# Patient Record
Sex: MALE | Race: WHITE | NOT HISPANIC OR LATINO | Employment: FULL TIME | URBAN - METROPOLITAN AREA
[De-identification: names, ages, dates, MRNs, and addresses within clinical notes are randomized per-mention and may not be internally consistent; named-entity substitution may affect disease eponyms.]

---

## 2023-05-18 ENCOUNTER — OFFICE VISIT (OUTPATIENT)
Dept: PRIMARY CARE | Facility: CLINIC | Age: 58
End: 2023-05-18
Payer: COMMERCIAL

## 2023-05-18 VITALS
RESPIRATION RATE: 17 BRPM | HEART RATE: 82 BPM | SYSTOLIC BLOOD PRESSURE: 140 MMHG | DIASTOLIC BLOOD PRESSURE: 70 MMHG | OXYGEN SATURATION: 94 % | HEIGHT: 64 IN | WEIGHT: 200 LBS | BODY MASS INDEX: 34.15 KG/M2 | TEMPERATURE: 98.2 F

## 2023-05-18 DIAGNOSIS — M79.672 PAIN IN BOTH FEET: ICD-10-CM

## 2023-05-18 DIAGNOSIS — S90.822A FRICTION BLISTERS OF SOLE OF LEFT FOOT, INITIAL ENCOUNTER: ICD-10-CM

## 2023-05-18 DIAGNOSIS — M79.671 PAIN IN BOTH FEET: ICD-10-CM

## 2023-05-18 DIAGNOSIS — G40.109 PARTIAL SYMPTOMATIC EPILEPSY WITH SIMPLE PARTIAL SEIZURES, NOT INTRACTABLE, WITHOUT STATUS EPILEPTICUS (MULTI): Primary | ICD-10-CM

## 2023-05-18 DIAGNOSIS — E78.2 MIXED HYPERLIPIDEMIA: ICD-10-CM

## 2023-05-18 DIAGNOSIS — R03.0 ELEVATED BLOOD PRESSURE READING: ICD-10-CM

## 2023-05-18 DIAGNOSIS — R35.1 NOCTURIA: ICD-10-CM

## 2023-05-18 PROBLEM — R51.9 FRONTAL HEADACHE: Status: RESOLVED | Noted: 2023-05-18 | Resolved: 2023-05-18

## 2023-05-18 PROBLEM — G47.39 COMPLEX SLEEP APNEA SYNDROME: Status: ACTIVE | Noted: 2023-05-18

## 2023-05-18 PROBLEM — R19.5 POSITIVE COLORECTAL CANCER SCREENING USING COLOGUARD TEST: Status: ACTIVE | Noted: 2023-05-18

## 2023-05-18 PROBLEM — G89.29 CHRONIC HEADACHE: Status: RESOLVED | Noted: 2023-05-18 | Resolved: 2023-05-18

## 2023-05-18 PROBLEM — E78.5 HYPERLIPEMIA: Status: ACTIVE | Noted: 2023-05-18

## 2023-05-18 PROBLEM — L84 CORN OF FOOT: Status: ACTIVE | Noted: 2023-05-18

## 2023-05-18 PROBLEM — J30.9 ALLERGIC RHINITIS: Status: ACTIVE | Noted: 2023-05-18

## 2023-05-18 PROBLEM — G40.909 EPILEPSY WITHOUT STATUS EPILEPTICUS, NOT INTRACTABLE (MULTI): Status: ACTIVE | Noted: 2023-05-18

## 2023-05-18 PROBLEM — M25.529 JOINT PAIN, ELBOW: Status: ACTIVE | Noted: 2023-05-18

## 2023-05-18 PROBLEM — G47.31 COMPLEX SLEEP APNEA SYNDROME: Status: ACTIVE | Noted: 2023-05-18

## 2023-05-18 PROBLEM — K21.9 ESOPHAGEAL REFLUX: Status: RESOLVED | Noted: 2023-05-18 | Resolved: 2023-05-18

## 2023-05-18 PROBLEM — R51.9 CHRONIC HEADACHE: Status: RESOLVED | Noted: 2023-05-18 | Resolved: 2023-05-18

## 2023-05-18 PROCEDURE — 1036F TOBACCO NON-USER: CPT | Performed by: NURSE PRACTITIONER

## 2023-05-18 PROCEDURE — 99214 OFFICE O/P EST MOD 30 MIN: CPT | Performed by: NURSE PRACTITIONER

## 2023-05-18 RX ORDER — LEVETIRACETAM 500 MG/1
1000 TABLET ORAL 2 TIMES DAILY
Qty: 360 TABLET | Refills: 1 | Status: ON HOLD | OUTPATIENT
Start: 2023-05-18 | End: 2024-01-26 | Stop reason: SDUPTHER

## 2023-05-18 RX ORDER — PHENYTOIN SODIUM 100 MG/1
100 CAPSULE, EXTENDED RELEASE ORAL DAILY
COMMUNITY
Start: 2015-09-17 | End: 2023-05-18 | Stop reason: SDUPTHER

## 2023-05-18 RX ORDER — LEVETIRACETAM 500 MG/1
2 TABLET ORAL 2 TIMES DAILY
COMMUNITY
Start: 2021-08-24 | End: 2023-05-18 | Stop reason: SDUPTHER

## 2023-05-18 RX ORDER — PHENYTOIN SODIUM 100 MG/1
100 CAPSULE, EXTENDED RELEASE ORAL DAILY
Qty: 90 CAPSULE | Refills: 1 | Status: SHIPPED | OUTPATIENT
Start: 2023-05-18 | End: 2024-01-26 | Stop reason: HOSPADM

## 2023-05-18 RX ORDER — DICLOFENAC SODIUM 30 MG/G
3 GEL TOPICAL 2 TIMES DAILY
COMMUNITY
Start: 2022-11-14

## 2023-05-18 ASSESSMENT — ENCOUNTER SYMPTOMS
CONSTITUTIONAL NEGATIVE: 1
CARDIOVASCULAR NEGATIVE: 1
EYES NEGATIVE: 1
ENDOCRINE NEGATIVE: 1
ALLERGIC/IMMUNOLOGIC NEGATIVE: 1
GASTROINTESTINAL NEGATIVE: 1
HEMATOLOGIC/LYMPHATIC NEGATIVE: 1
RESPIRATORY NEGATIVE: 1
PSYCHIATRIC NEGATIVE: 1
MUSCULOSKELETAL NEGATIVE: 1
NEUROLOGICAL NEGATIVE: 1

## 2023-05-18 NOTE — PATIENT INSTRUCTIONS
Dr Camacho Kulkarni and Dr Yancey   Address: [address] [place] [address], [place] [address]  Hours:   Closed · Opens 9 AM Fri  Phone: [phone]

## 2023-05-18 NOTE — PROGRESS NOTES
"Subjective   Patient ID: Chas Godinez is a 58 y.o. male who presents for Follow-up, Med Refill, and Blister (Gets blisters on bottom on feet ).      Has been developing blisters to bilateral feet for the past year.  Has at times put corn pads to areas but forgets to do it everyday.  Stands on feet daily at work, 8-10 hours a day.  Wears steal toe tennis shoes to work. States that socks are not damp from sweat, does not feel feet sweat during the day. When blisters are active has a hard time walking and rodri painful 8/10.     He has not had any seizures since the last one 1.5 years ago     Needs blood work          Review of Systems   Constitutional: Negative.    HENT: Negative.     Eyes: Negative.    Respiratory: Negative.     Cardiovascular: Negative.    Gastrointestinal: Negative.    Endocrine: Negative.    Genitourinary: Negative.    Musculoskeletal: Negative.    Skin: Negative.    Allergic/Immunologic: Negative.    Neurological: Negative.    Hematological: Negative.    Psychiatric/Behavioral: Negative.         Objective   /70   Pulse 82   Temp 36.8 °C (98.2 °F)   Resp 17   Ht 1.613 m (5' 3.5\")   Wt 90.7 kg (200 lb)   SpO2 94%   BMI 34.87 kg/m²     Physical Exam  Constitutional:       Appearance: Normal appearance.   HENT:      Head: Normocephalic.      Nose: Nose normal.      Mouth/Throat:      Mouth: Mucous membranes are moist.   Eyes:      Extraocular Movements: Extraocular movements intact.      Pupils: Pupils are equal, round, and reactive to light.   Cardiovascular:      Rate and Rhythm: Normal rate and regular rhythm.      Pulses: Normal pulses.      Heart sounds: Normal heart sounds.   Pulmonary:      Effort: Pulmonary effort is normal.      Breath sounds: Normal breath sounds.   Abdominal:      General: Bowel sounds are normal.      Palpations: Abdomen is soft.   Musculoskeletal:         General: Normal range of motion.      Cervical back: Normal range of motion.   Skin:     Comments: Left " planter foot at medial aspect of the arch, blister with callous present   Neurological:      Mental Status: He is alert.   Psychiatric:         Mood and Affect: Mood normal.         Behavior: Behavior normal.         Thought Content: Thought content normal.         Judgment: Judgment normal.         Assessment/Plan   Problem List Items Addressed This Visit          Nervous    Epilepsy without status epilepticus, not intractable (CMS/HCC) - Primary    Relevant Medications    phenytoin ER (Dilantin) 100 mg capsule    levETIRAcetam (Keppra) 500 mg tablet    Other Relevant Orders    CBC and Auto Differential    Comprehensive Metabolic Panel       Other    Hyperlipemia    Relevant Orders    Hemoglobin A1C     Other Visit Diagnoses       Elevated blood pressure reading        Relevant Orders    Comprehensive Metabolic Panel    Lipid Panel    TSH with reflex to Free T4 if abnormal    Nocturia        Relevant Orders    Prostate Specific Antigen    Pain in both feet        Friction blisters of sole of left foot, initial encounter

## 2023-07-19 ENCOUNTER — HOSPITAL ENCOUNTER (OUTPATIENT)
Dept: DATA CONVERSION | Facility: HOSPITAL | Age: 58
End: 2023-07-19
Attending: INTERNAL MEDICINE | Admitting: INTERNAL MEDICINE
Payer: COMMERCIAL

## 2023-07-19 DIAGNOSIS — K29.50 UNSPECIFIED CHRONIC GASTRITIS WITHOUT BLEEDING: ICD-10-CM

## 2023-07-19 DIAGNOSIS — R13.10 DYSPHAGIA, UNSPECIFIED: ICD-10-CM

## 2023-07-19 DIAGNOSIS — K21.9 GASTRO-ESOPHAGEAL REFLUX DISEASE WITHOUT ESOPHAGITIS: ICD-10-CM

## 2023-07-26 LAB
COMPLETE PATHOLOGY REPORT: NORMAL
CONVERTED CLINICAL DIAGNOSIS-HISTORY: NORMAL
CONVERTED FINAL DIAGNOSIS: NORMAL
CONVERTED FINAL REPORT PDF LINK TO COPY AND PASTE: NORMAL
CONVERTED GROSS DESCRIPTION: NORMAL

## 2023-08-10 ENCOUNTER — TELEPHONE (OUTPATIENT)
Dept: PRIMARY CARE | Facility: CLINIC | Age: 58
End: 2023-08-10
Payer: COMMERCIAL

## 2023-08-10 DIAGNOSIS — G40.109 PARTIAL SYMPTOMATIC EPILEPSY WITH SIMPLE PARTIAL SEIZURES, NOT INTRACTABLE, WITHOUT STATUS EPILEPTICUS (MULTI): ICD-10-CM

## 2023-08-10 NOTE — TELEPHONE ENCOUNTER
Wrong amount of Dilantin was sent in    He takes 4-100mg daily #360    Please send to [facility]

## 2023-08-11 RX ORDER — PHENYTOIN SODIUM 100 MG/1
4 CAPSULE, EXTENDED RELEASE ORAL DAILY
COMMUNITY
Start: 2015-09-17 | End: 2023-08-11 | Stop reason: SDUPTHER

## 2023-08-11 RX ORDER — PHENYTOIN SODIUM 100 MG/1
400 CAPSULE, EXTENDED RELEASE ORAL DAILY
Qty: 360 CAPSULE | Refills: 3 | Status: SHIPPED | OUTPATIENT
Start: 2023-08-11 | End: 2024-02-13 | Stop reason: SDUPTHER

## 2023-09-29 VITALS — HEIGHT: 65 IN | WEIGHT: 198.41 LBS | BODY MASS INDEX: 33.06 KG/M2

## 2023-11-10 ENCOUNTER — TELEPHONE (OUTPATIENT)
Dept: GASTROENTEROLOGY | Facility: CLINIC | Age: 58
End: 2023-11-10
Payer: COMMERCIAL

## 2024-01-23 ENCOUNTER — ANESTHESIA EVENT (OUTPATIENT)
Dept: GASTROENTEROLOGY | Facility: HOSPITAL | Age: 59
End: 2024-01-23
Payer: COMMERCIAL

## 2024-01-23 ENCOUNTER — HOSPITAL ENCOUNTER (OUTPATIENT)
Dept: GASTROENTEROLOGY | Facility: HOSPITAL | Age: 59
Discharge: HOME | End: 2024-01-23
Payer: COMMERCIAL

## 2024-01-23 ENCOUNTER — ANESTHESIA (OUTPATIENT)
Dept: GASTROENTEROLOGY | Facility: HOSPITAL | Age: 59
End: 2024-01-23
Payer: COMMERCIAL

## 2024-01-23 VITALS
RESPIRATION RATE: 16 BRPM | HEART RATE: 67 BPM | TEMPERATURE: 97.8 F | DIASTOLIC BLOOD PRESSURE: 99 MMHG | SYSTOLIC BLOOD PRESSURE: 168 MMHG | OXYGEN SATURATION: 95 %

## 2024-01-23 DIAGNOSIS — R19.5 POSITIVE COLORECTAL CANCER SCREENING USING COLOGUARD TEST: ICD-10-CM

## 2024-01-23 PROCEDURE — 7100000009 HC PHASE TWO TIME - INITIAL BASE CHARGE: Performed by: INTERNAL MEDICINE

## 2024-01-23 PROCEDURE — 3700000001 HC GENERAL ANESTHESIA TIME - INITIAL BASE CHARGE: Performed by: INTERNAL MEDICINE

## 2024-01-23 PROCEDURE — 3700000002 HC GENERAL ANESTHESIA TIME - EACH INCREMENTAL 1 MINUTE: Performed by: INTERNAL MEDICINE

## 2024-01-23 PROCEDURE — 88305 TISSUE EXAM BY PATHOLOGIST: CPT | Performed by: STUDENT IN AN ORGANIZED HEALTH CARE EDUCATION/TRAINING PROGRAM

## 2024-01-23 PROCEDURE — A4649 SURGICAL SUPPLIES: HCPCS | Performed by: INTERNAL MEDICINE

## 2024-01-23 PROCEDURE — 2500000004 HC RX 250 GENERAL PHARMACY W/ HCPCS (ALT 636 FOR OP/ED): Performed by: INTERNAL MEDICINE

## 2024-01-23 PROCEDURE — 7100000010 HC PHASE TWO TIME - EACH INCREMENTAL 1 MINUTE: Performed by: INTERNAL MEDICINE

## 2024-01-23 PROCEDURE — 45385 COLONOSCOPY W/LESION REMOVAL: CPT | Performed by: INTERNAL MEDICINE

## 2024-01-23 PROCEDURE — 2500000005 HC RX 250 GENERAL PHARMACY W/O HCPCS: Performed by: NURSE ANESTHETIST, CERTIFIED REGISTERED

## 2024-01-23 PROCEDURE — 88305 TISSUE EXAM BY PATHOLOGIST: CPT | Mod: TC,SUR,PORLAB | Performed by: INTERNAL MEDICINE

## 2024-01-23 PROCEDURE — 2500000004 HC RX 250 GENERAL PHARMACY W/ HCPCS (ALT 636 FOR OP/ED): Performed by: NURSE ANESTHETIST, CERTIFIED REGISTERED

## 2024-01-23 RX ORDER — LIDOCAINE HYDROCHLORIDE 20 MG/ML
INJECTION, SOLUTION INFILTRATION; PERINEURAL AS NEEDED
Status: DISCONTINUED | OUTPATIENT
Start: 2024-01-23 | End: 2024-01-23

## 2024-01-23 RX ORDER — FENTANYL CITRATE 50 UG/ML
INJECTION, SOLUTION INTRAMUSCULAR; INTRAVENOUS AS NEEDED
Status: DISCONTINUED | OUTPATIENT
Start: 2024-01-23 | End: 2024-01-23

## 2024-01-23 RX ORDER — SODIUM CHLORIDE 9 MG/ML
20 INJECTION, SOLUTION INTRAVENOUS CONTINUOUS
Status: DISCONTINUED | OUTPATIENT
Start: 2024-01-23 | End: 2024-01-24 | Stop reason: HOSPADM

## 2024-01-23 RX ORDER — PROPOFOL 10 MG/ML
INJECTION, EMULSION INTRAVENOUS AS NEEDED
Status: DISCONTINUED | OUTPATIENT
Start: 2024-01-23 | End: 2024-01-23

## 2024-01-23 RX ADMIN — FENTANYL CITRATE 25 MCG: 50 INJECTION, SOLUTION INTRAMUSCULAR; INTRAVENOUS at 09:33

## 2024-01-23 RX ADMIN — LIDOCAINE HYDROCHLORIDE 4 ML: 20 INJECTION, SOLUTION INFILTRATION; PERINEURAL at 09:30

## 2024-01-23 RX ADMIN — FENTANYL CITRATE 25 MCG: 50 INJECTION, SOLUTION INTRAMUSCULAR; INTRAVENOUS at 09:39

## 2024-01-23 RX ADMIN — PROPOFOL 50 MG: 10 INJECTION, EMULSION INTRAVENOUS at 09:33

## 2024-01-23 RX ADMIN — FENTANYL CITRATE 50 MCG: 50 INJECTION, SOLUTION INTRAMUSCULAR; INTRAVENOUS at 09:30

## 2024-01-23 RX ADMIN — PROPOFOL 100 MG: 10 INJECTION, EMULSION INTRAVENOUS at 09:30

## 2024-01-23 RX ADMIN — PROPOFOL 20 MG: 10 INJECTION, EMULSION INTRAVENOUS at 09:46

## 2024-01-23 RX ADMIN — PROPOFOL 30 MG: 10 INJECTION, EMULSION INTRAVENOUS at 09:39

## 2024-01-23 RX ADMIN — SODIUM CHLORIDE 20 ML/HR: 9 INJECTION, SOLUTION INTRAVENOUS at 09:15

## 2024-01-23 SDOH — HEALTH STABILITY: MENTAL HEALTH: CURRENT SMOKER: 0

## 2024-01-23 ASSESSMENT — COLUMBIA-SUICIDE SEVERITY RATING SCALE - C-SSRS
1. IN THE PAST MONTH, HAVE YOU WISHED YOU WERE DEAD OR WISHED YOU COULD GO TO SLEEP AND NOT WAKE UP?: NO
6. HAVE YOU EVER DONE ANYTHING, STARTED TO DO ANYTHING, OR PREPARED TO DO ANYTHING TO END YOUR LIFE?: NO
2. HAVE YOU ACTUALLY HAD ANY THOUGHTS OF KILLING YOURSELF?: NO

## 2024-01-23 ASSESSMENT — PAIN SCALES - GENERAL
PAINLEVEL_OUTOF10: 0 - NO PAIN
PAIN_LEVEL: 0
PAINLEVEL_OUTOF10: 0 - NO PAIN

## 2024-01-23 ASSESSMENT — PAIN - FUNCTIONAL ASSESSMENT
PAIN_FUNCTIONAL_ASSESSMENT: 0-10

## 2024-01-23 NOTE — ANESTHESIA POSTPROCEDURE EVALUATION
Patient: Chas Godinez    Procedure Summary       Date: 01/23/24 Room / Location: [facility]    Anesthesia Start: 0925 Anesthesia Stop: 1000    Procedure: COLONOSCOPY Diagnosis: Positive colorectal cancer screening using Cologuard test    Scheduled Providers: Augie Painting DO Responsible Provider: LADAN Folr    Anesthesia Type: MAC ASA Status: 3            Anesthesia Type: MAC    Vitals Value Taken Time   /75 01/23/24 1000   Temp 36.4 °C (97.6 °F) 01/23/24 1000   Pulse 75 01/23/24 1000   Resp 16 01/23/24 1000   SpO2 94 % 01/23/24 1000       Anesthesia Post Evaluation    Patient location during evaluation: bedside  Patient participation: complete - patient participated  Level of consciousness: awake and alert  Pain score: 0  Pain management: adequate  Airway patency: patent  Cardiovascular status: acceptable  Respiratory status: acceptable  Hydration status: acceptable  Postoperative Nausea and Vomiting: none    There were no known notable events for this encounter.

## 2024-01-23 NOTE — H&P
History Of Present Illness  Chas Godinez is a 58 y.o. male presenting with + cologard .     Past Medical History  Past Medical History:   Diagnosis Date    Epilepsy (CMS/HCC)     Esophageal reflux 05/18/2023       Surgical History  Past Surgical History:   Procedure Laterality Date    ELBOW SURGERY  09/19/2016    Elbow Surgery        Social History  He reports that he quit smoking about 37 years ago. His smoking use included cigarettes. He has never used smokeless tobacco. He reports that he does not drink alcohol and does not use drugs.    Family History  No family history on file.     Allergies  Patient has no known allergies.    Review of Systems     Physical Exam  Vitals reviewed.   Constitutional:       General: He is awake.      Appearance: Normal appearance.   HENT:      Head: Normocephalic and atraumatic.      Nose: Nose normal.      Mouth/Throat:      Mouth: Mucous membranes are moist.   Eyes:      Pupils: Pupils are equal, round, and reactive to light.   Cardiovascular:      Rate and Rhythm: Normal rate.   Pulmonary:      Effort: Pulmonary effort is normal.   Neurological:      Mental Status: He is alert and oriented to person, place, and time. Mental status is at baseline.   Psychiatric:         Attention and Perception: Attention and perception normal.         Mood and Affect: Mood normal.         Behavior: Behavior normal.          Last Recorded Vitals  Blood pressure (!) 185/98, pulse 73, temperature 36.3 °C (97.3 °F), temperature source Temporal, resp. rate 20, SpO2 99 %.    Relevant Results        Current Outpatient Medications   Medication Instructions    diclofenac sodium 3 % gel 3 Applications, Topical, 2 times daily    levETIRAcetam (KEPPRA) 1,000 mg, oral, 2 times daily    phenytoin ER (DILANTIN) 100 mg, oral, Daily    phenytoin ER (DILANTIN) 400 mg, oral, Daily          Assessment/Plan   Active Problems:  There are no active Hospital Problems.      + coplogard     Jaya for graham MONTERO  DO Mert

## 2024-01-24 NOTE — ADDENDUM NOTE
Encounter addended by: Delores Wyatt RN on: 1/24/2024 10:51 AM   Actions taken: Contacts section saved, Flowsheet accepted

## 2024-01-25 ENCOUNTER — APPOINTMENT (OUTPATIENT)
Dept: CARDIOLOGY | Facility: HOSPITAL | Age: 59
DRG: 084 | End: 2024-01-25
Payer: COMMERCIAL

## 2024-01-25 ENCOUNTER — APPOINTMENT (OUTPATIENT)
Dept: RADIOLOGY | Facility: HOSPITAL | Age: 59
DRG: 084 | End: 2024-01-25
Payer: COMMERCIAL

## 2024-01-25 ENCOUNTER — HOSPITAL ENCOUNTER (INPATIENT)
Facility: HOSPITAL | Age: 59
LOS: 1 days | Discharge: HOME | DRG: 084 | End: 2024-01-26
Attending: EMERGENCY MEDICINE | Admitting: INTERNAL MEDICINE
Payer: COMMERCIAL

## 2024-01-25 DIAGNOSIS — R56.9 SEIZURE (MULTI): Primary | ICD-10-CM

## 2024-01-25 DIAGNOSIS — I60.9 SUBARACHNOID HEMORRHAGE (MULTI): ICD-10-CM

## 2024-01-25 DIAGNOSIS — G40.109 PARTIAL SYMPTOMATIC EPILEPSY WITH SIMPLE PARTIAL SEIZURES, NOT INTRACTABLE, WITHOUT STATUS EPILEPTICUS (MULTI): ICD-10-CM

## 2024-01-25 DIAGNOSIS — S09.90XA TRAUMATIC INJURY OF HEAD, INITIAL ENCOUNTER: ICD-10-CM

## 2024-01-25 DIAGNOSIS — I62.00 SUBDURAL HEMORRHAGE (MULTI): ICD-10-CM

## 2024-01-25 LAB
ALBUMIN SERPL BCP-MCNC: 4.5 G/DL (ref 3.4–5)
ALP SERPL-CCNC: 78 U/L (ref 33–120)
ALT SERPL W P-5'-P-CCNC: 28 U/L (ref 10–52)
ANION GAP SERPL CALC-SCNC: 13 MMOL/L (ref 10–20)
APPEARANCE UR: CLEAR
AST SERPL W P-5'-P-CCNC: 23 U/L (ref 9–39)
ATRIAL RATE: 84 BPM
BASOPHILS # BLD AUTO: 0.02 X10*3/UL (ref 0–0.1)
BASOPHILS NFR BLD AUTO: 0.2 %
BILIRUB SERPL-MCNC: 0.4 MG/DL (ref 0–1.2)
BILIRUB UR STRIP.AUTO-MCNC: NEGATIVE MG/DL
BUN SERPL-MCNC: 11 MG/DL (ref 6–23)
CALCIUM SERPL-MCNC: 9 MG/DL (ref 8.6–10.3)
CHLORIDE SERPL-SCNC: 103 MMOL/L (ref 98–107)
CO2 SERPL-SCNC: 27 MMOL/L (ref 21–32)
COLOR UR: YELLOW
CREAT SERPL-MCNC: 0.87 MG/DL (ref 0.5–1.3)
EGFRCR SERPLBLD CKD-EPI 2021: >90 ML/MIN/1.73M*2
EOSINOPHIL # BLD AUTO: 0.08 X10*3/UL (ref 0–0.7)
EOSINOPHIL NFR BLD AUTO: 0.9 %
ERYTHROCYTE [DISTWIDTH] IN BLOOD BY AUTOMATED COUNT: 12.5 % (ref 11.5–14.5)
GLUCOSE SERPL-MCNC: 100 MG/DL (ref 74–99)
GLUCOSE UR STRIP.AUTO-MCNC: ABNORMAL MG/DL
HCT VFR BLD AUTO: 44 % (ref 41–52)
HGB BLD-MCNC: 14.7 G/DL (ref 13.5–17.5)
HOLD SPECIMEN: NORMAL
IMM GRANULOCYTES # BLD AUTO: 0.04 X10*3/UL (ref 0–0.7)
IMM GRANULOCYTES NFR BLD AUTO: 0.5 % (ref 0–0.9)
KETONES UR STRIP.AUTO-MCNC: NEGATIVE MG/DL
LACTATE SERPL-SCNC: 1.5 MMOL/L (ref 0.4–2)
LEUKOCYTE ESTERASE UR QL STRIP.AUTO: NEGATIVE
LYMPHOCYTES # BLD AUTO: 1.09 X10*3/UL (ref 1.2–4.8)
LYMPHOCYTES NFR BLD AUTO: 12.3 %
MAGNESIUM SERPL-MCNC: 2.3 MG/DL (ref 1.6–2.4)
MCH RBC QN AUTO: 30.5 PG (ref 26–34)
MCHC RBC AUTO-ENTMCNC: 33.4 G/DL (ref 32–36)
MCV RBC AUTO: 91 FL (ref 80–100)
MONOCYTES # BLD AUTO: 0.57 X10*3/UL (ref 0.1–1)
MONOCYTES NFR BLD AUTO: 6.4 %
MUCOUS THREADS #/AREA URNS AUTO: NORMAL /LPF
NEUTROPHILS # BLD AUTO: 7.08 X10*3/UL (ref 1.2–7.7)
NEUTROPHILS NFR BLD AUTO: 79.7 %
NITRITE UR QL STRIP.AUTO: NEGATIVE
NRBC BLD-RTO: 0 /100 WBCS (ref 0–0)
P AXIS: 20 DEGREES
P OFFSET: 177 MS
P ONSET: 128 MS
PH UR STRIP.AUTO: 5 [PH]
PHENYTOIN SERPL-MCNC: 25.2 UG/ML (ref 10–20)
PLATELET # BLD AUTO: 241 X10*3/UL (ref 150–450)
POTASSIUM SERPL-SCNC: 3.9 MMOL/L (ref 3.5–5.3)
PR INTERVAL: 164 MS
PROT SERPL-MCNC: 7.7 G/DL (ref 6.4–8.2)
PROT UR STRIP.AUTO-MCNC: ABNORMAL MG/DL
Q ONSET: 210 MS
QRS COUNT: 13 BEATS
QRS DURATION: 102 MS
QT INTERVAL: 404 MS
QTC CALCULATION(BAZETT): 477 MS
QTC FREDERICIA: 451 MS
R AXIS: -11 DEGREES
RBC # BLD AUTO: 4.82 X10*6/UL (ref 4.5–5.9)
RBC # UR STRIP.AUTO: NEGATIVE /UL
RBC #/AREA URNS AUTO: NORMAL /HPF
SODIUM SERPL-SCNC: 139 MMOL/L (ref 136–145)
SP GR UR STRIP.AUTO: 1.03
T AXIS: 39 DEGREES
T OFFSET: 412 MS
UROBILINOGEN UR STRIP.AUTO-MCNC: <2 MG/DL
VENTRICULAR RATE: 84 BPM
WBC # BLD AUTO: 8.9 X10*3/UL (ref 4.4–11.3)
WBC #/AREA URNS AUTO: NORMAL /HPF

## 2024-01-25 PROCEDURE — 93005 ELECTROCARDIOGRAM TRACING: CPT

## 2024-01-25 PROCEDURE — 36415 COLL VENOUS BLD VENIPUNCTURE: CPT | Performed by: PHYSICIAN ASSISTANT

## 2024-01-25 PROCEDURE — 80177 DRUG SCRN QUAN LEVETIRACETAM: CPT | Mod: AHULAB | Performed by: PHYSICIAN ASSISTANT

## 2024-01-25 PROCEDURE — 85025 COMPLETE CBC W/AUTO DIFF WBC: CPT | Performed by: EMERGENCY MEDICINE

## 2024-01-25 PROCEDURE — 2500000004 HC RX 250 GENERAL PHARMACY W/ HCPCS (ALT 636 FOR OP/ED): Performed by: EMERGENCY MEDICINE

## 2024-01-25 PROCEDURE — 0HQ0XZZ REPAIR SCALP SKIN, EXTERNAL APPROACH: ICD-10-PCS

## 2024-01-25 PROCEDURE — 80053 COMPREHEN METABOLIC PANEL: CPT | Performed by: EMERGENCY MEDICINE

## 2024-01-25 PROCEDURE — 96374 THER/PROPH/DIAG INJ IV PUSH: CPT

## 2024-01-25 PROCEDURE — 83605 ASSAY OF LACTIC ACID: CPT | Performed by: EMERGENCY MEDICINE

## 2024-01-25 PROCEDURE — 70450 CT HEAD/BRAIN W/O DYE: CPT | Performed by: RADIOLOGY

## 2024-01-25 PROCEDURE — 2500000001 HC RX 250 WO HCPCS SELF ADMINISTERED DRUGS (ALT 637 FOR MEDICARE OP): Performed by: PHYSICIAN ASSISTANT

## 2024-01-25 PROCEDURE — 13121 CMPLX RPR S/A/L 2.6-7.5 CM: CPT

## 2024-01-25 PROCEDURE — 80185 ASSAY OF PHENYTOIN TOTAL: CPT | Performed by: PHYSICIAN ASSISTANT

## 2024-01-25 PROCEDURE — 70450 CT HEAD/BRAIN W/O DYE: CPT

## 2024-01-25 PROCEDURE — 81001 URINALYSIS AUTO W/SCOPE: CPT | Performed by: EMERGENCY MEDICINE

## 2024-01-25 PROCEDURE — 83735 ASSAY OF MAGNESIUM: CPT | Performed by: EMERGENCY MEDICINE

## 2024-01-25 PROCEDURE — 99223 1ST HOSP IP/OBS HIGH 75: CPT | Performed by: PHYSICIAN ASSISTANT

## 2024-01-25 PROCEDURE — 1200000002 HC GENERAL ROOM WITH TELEMETRY DAILY

## 2024-01-25 PROCEDURE — 5A09357 ASSISTANCE WITH RESPIRATORY VENTILATION, LESS THAN 24 CONSECUTIVE HOURS, CONTINUOUS POSITIVE AIRWAY PRESSURE: ICD-10-PCS | Performed by: PHYSICIAN ASSISTANT

## 2024-01-25 PROCEDURE — 99285 EMERGENCY DEPT VISIT HI MDM: CPT | Mod: 27,25 | Performed by: EMERGENCY MEDICINE

## 2024-01-25 PROCEDURE — 36415 COLL VENOUS BLD VENIPUNCTURE: CPT | Performed by: EMERGENCY MEDICINE

## 2024-01-25 RX ORDER — ACETAMINOPHEN 325 MG/1
975 TABLET ORAL ONCE
Status: COMPLETED | OUTPATIENT
Start: 2024-01-25 | End: 2024-01-25

## 2024-01-25 RX ORDER — ACETAMINOPHEN 325 MG/1
650 TABLET ORAL EVERY 6 HOURS PRN
Status: DISCONTINUED | OUTPATIENT
Start: 2024-01-25 | End: 2024-01-25

## 2024-01-25 RX ORDER — ONDANSETRON HYDROCHLORIDE 2 MG/ML
4 INJECTION, SOLUTION INTRAVENOUS EVERY 8 HOURS PRN
Status: DISCONTINUED | OUTPATIENT
Start: 2024-01-25 | End: 2024-01-26 | Stop reason: HOSPADM

## 2024-01-25 RX ORDER — LABETALOL HYDROCHLORIDE 5 MG/ML
10 INJECTION, SOLUTION INTRAVENOUS ONCE
Status: COMPLETED | OUTPATIENT
Start: 2024-01-25 | End: 2024-01-25

## 2024-01-25 RX ORDER — LIDOCAINE HYDROCHLORIDE 10 MG/ML
5 INJECTION INFILTRATION; PERINEURAL ONCE
Status: DISCONTINUED | OUTPATIENT
Start: 2024-01-25 | End: 2024-01-26 | Stop reason: HOSPADM

## 2024-01-25 RX ORDER — ONDANSETRON 4 MG/1
4 TABLET, ORALLY DISINTEGRATING ORAL EVERY 8 HOURS PRN
Status: DISCONTINUED | OUTPATIENT
Start: 2024-01-25 | End: 2024-01-26 | Stop reason: HOSPADM

## 2024-01-25 RX ORDER — LEVETIRACETAM 500 MG/1
1000 TABLET ORAL 2 TIMES DAILY
Status: DISCONTINUED | OUTPATIENT
Start: 2024-01-25 | End: 2024-01-26 | Stop reason: HOSPADM

## 2024-01-25 RX ORDER — ACETAMINOPHEN 160 MG/5ML
650 SOLUTION ORAL EVERY 4 HOURS PRN
Status: DISCONTINUED | OUTPATIENT
Start: 2024-01-25 | End: 2024-01-26 | Stop reason: HOSPADM

## 2024-01-25 RX ORDER — ACETAMINOPHEN 650 MG/1
650 SUPPOSITORY RECTAL EVERY 4 HOURS PRN
Status: DISCONTINUED | OUTPATIENT
Start: 2024-01-25 | End: 2024-01-26 | Stop reason: HOSPADM

## 2024-01-25 RX ORDER — ACETAMINOPHEN 325 MG/1
650 TABLET ORAL EVERY 4 HOURS PRN
Status: DISCONTINUED | OUTPATIENT
Start: 2024-01-25 | End: 2024-01-26 | Stop reason: HOSPADM

## 2024-01-25 RX ORDER — PHENYTOIN SODIUM 100 MG/1
400 CAPSULE, EXTENDED RELEASE ORAL DAILY
Status: DISCONTINUED | OUTPATIENT
Start: 2024-01-25 | End: 2024-01-26 | Stop reason: HOSPADM

## 2024-01-25 RX ORDER — SENNOSIDES 8.6 MG/1
2 TABLET ORAL 2 TIMES DAILY
Status: DISCONTINUED | OUTPATIENT
Start: 2024-01-25 | End: 2024-01-26 | Stop reason: HOSPADM

## 2024-01-25 RX ADMIN — LEVETIRACETAM 1000 MG: 500 TABLET, FILM COATED ORAL at 20:58

## 2024-01-25 RX ADMIN — ACETAMINOPHEN 650 MG: 650 SOLUTION ORAL at 15:48

## 2024-01-25 RX ADMIN — LABETALOL HYDROCHLORIDE 10 MG: 5 INJECTION, SOLUTION INTRAVENOUS at 11:26

## 2024-01-25 RX ADMIN — ACETAMINOPHEN 975 MG: 325 TABLET ORAL at 11:26

## 2024-01-25 RX ADMIN — PHENYTOIN SODIUM 400 MG: 100 CAPSULE ORAL at 15:46

## 2024-01-25 RX ADMIN — SENNOSIDES 17.2 MG: 8.6 TABLET, FILM COATED ORAL at 20:58

## 2024-01-25 SDOH — SOCIAL STABILITY: SOCIAL INSECURITY: ARE THERE ANY APPARENT SIGNS OF INJURIES/BEHAVIORS THAT COULD BE RELATED TO ABUSE/NEGLECT?: NO

## 2024-01-25 SDOH — SOCIAL STABILITY: SOCIAL INSECURITY: WERE YOU ABLE TO COMPLETE ALL THE BEHAVIORAL HEALTH SCREENINGS?: YES

## 2024-01-25 SDOH — SOCIAL STABILITY: SOCIAL INSECURITY: ABUSE: ADULT

## 2024-01-25 SDOH — SOCIAL STABILITY: SOCIAL INSECURITY: DOES ANYONE TRY TO KEEP YOU FROM HAVING/CONTACTING OTHER FRIENDS OR DOING THINGS OUTSIDE YOUR HOME?: NO

## 2024-01-25 SDOH — SOCIAL STABILITY: SOCIAL INSECURITY: ARE YOU OR HAVE YOU BEEN THREATENED OR ABUSED PHYSICALLY, EMOTIONALLY, OR SEXUALLY BY ANYONE?: NO

## 2024-01-25 SDOH — SOCIAL STABILITY: SOCIAL INSECURITY: HAVE YOU HAD THOUGHTS OF HARMING ANYONE ELSE?: YES

## 2024-01-25 SDOH — SOCIAL STABILITY: SOCIAL INSECURITY: DO YOU FEEL ANYONE HAS EXPLOITED OR TAKEN ADVANTAGE OF YOU FINANCIALLY OR OF YOUR PERSONAL PROPERTY?: NO

## 2024-01-25 SDOH — SOCIAL STABILITY: SOCIAL INSECURITY: DO YOU FEEL UNSAFE GOING BACK TO THE PLACE WHERE YOU ARE LIVING?: NO

## 2024-01-25 SDOH — SOCIAL STABILITY: SOCIAL INSECURITY: HAS ANYONE EVER THREATENED TO HURT YOUR FAMILY OR YOUR PETS?: NO

## 2024-01-25 ASSESSMENT — ACTIVITIES OF DAILY LIVING (ADL)
LACK_OF_TRANSPORTATION: NO
JUDGMENT_ADEQUATE_SAFELY_COMPLETE_DAILY_ACTIVITIES: YES
ADEQUATE_TO_COMPLETE_ADL: YES
HEARING - RIGHT EAR: FUNCTIONAL
FEEDING YOURSELF: INDEPENDENT
HEARING - LEFT EAR: FUNCTIONAL
GROOMING: INDEPENDENT
WALKS IN HOME: INDEPENDENT
TOILETING: INDEPENDENT
PATIENT'S MEMORY ADEQUATE TO SAFELY COMPLETE DAILY ACTIVITIES?: YES
DRESSING YOURSELF: INDEPENDENT
BATHING: INDEPENDENT

## 2024-01-25 ASSESSMENT — COGNITIVE AND FUNCTIONAL STATUS - GENERAL
MOBILITY SCORE: 24
MOBILITY SCORE: 24
PATIENT BASELINE BEDBOUND: NO
DAILY ACTIVITIY SCORE: 24
DAILY ACTIVITIY SCORE: 24

## 2024-01-25 ASSESSMENT — COLUMBIA-SUICIDE SEVERITY RATING SCALE - C-SSRS
1. IN THE PAST MONTH, HAVE YOU WISHED YOU WERE DEAD OR WISHED YOU COULD GO TO SLEEP AND NOT WAKE UP?: NO
2. HAVE YOU ACTUALLY HAD ANY THOUGHTS OF KILLING YOURSELF?: NO
6. HAVE YOU EVER DONE ANYTHING, STARTED TO DO ANYTHING, OR PREPARED TO DO ANYTHING TO END YOUR LIFE?: NO

## 2024-01-25 ASSESSMENT — PAIN SCALES - GENERAL
PAINLEVEL_OUTOF10: 3
PAINLEVEL_OUTOF10: 0 - NO PAIN
PAINLEVEL_OUTOF10: 4
PAINLEVEL_OUTOF10: 3
PAINLEVEL_OUTOF10: 0 - NO PAIN
PAINLEVEL_OUTOF10: 0 - NO PAIN

## 2024-01-25 ASSESSMENT — LIFESTYLE VARIABLES
HOW OFTEN DO YOU HAVE 6 OR MORE DRINKS ON ONE OCCASION: NEVER
PRESCIPTION_ABUSE_PAST_12_MONTHS: NO
HOW MANY STANDARD DRINKS CONTAINING ALCOHOL DO YOU HAVE ON A TYPICAL DAY: PATIENT DOES NOT DRINK
SUBSTANCE_ABUSE_PAST_12_MONTHS: NO
HOW OFTEN DO YOU HAVE A DRINK CONTAINING ALCOHOL: NEVER
SKIP TO QUESTIONS 9-10: 1
AUDIT-C TOTAL SCORE: 0
AUDIT-C TOTAL SCORE: 0

## 2024-01-25 ASSESSMENT — PATIENT HEALTH QUESTIONNAIRE - PHQ9
SUM OF ALL RESPONSES TO PHQ9 QUESTIONS 1 & 2: 0
2. FEELING DOWN, DEPRESSED OR HOPELESS: NOT AT ALL
1. LITTLE INTEREST OR PLEASURE IN DOING THINGS: NOT AT ALL

## 2024-01-25 ASSESSMENT — PAIN - FUNCTIONAL ASSESSMENT
PAIN_FUNCTIONAL_ASSESSMENT: 0-10

## 2024-01-25 ASSESSMENT — PAIN DESCRIPTION - DESCRIPTORS: DESCRIPTORS: ACHING

## 2024-01-25 ASSESSMENT — PAIN DESCRIPTION - LOCATION: LOCATION: HEAD

## 2024-01-25 NOTE — PROGRESS NOTES
Transitional Care Coordination Progress Note:  Plan per Medical/Surgical team: treatment of seizure @ work with laceration on back of head sutured  Status: inpatient  Payor source: [employer]  Discharge disposition: Home with sister & brother in law  Potential Barriers: /103 with labetalol   ADOD: 1/26/2024  CAYLA Malik RN, BSN Transitional Care Coordinator ED# [phone]      01/25/24 1358   Discharge Planning   Living Arrangements Family members   Support Systems Family members   Assistance Needed neuro work up   Type of Residence Private residence   Number of Stairs to Enter Residence 1   Number of Stairs Within Residence 0   Home or Post Acute Services None   Patient expects to be discharged to: Home with sister & brother in law   Does the patient need discharge transport arranged? Yes   RoundTrip coordination needed? Yes   Has discharge transport been arranged? No   Financial Resource Strain   How hard is it for you to pay for the very basics like food, housing, medical care, and heating? Not hard   Housing Stability   In the last 12 months, was there a time when you were not able to pay the mortgage or rent on time? N   In the last 12 months, how many places have you lived? 1   In the last 12 months, was there a time when you did not have a steady place to sleep or slept in a shelter (including now)? N   Transportation Needs   In the past 12 months, has lack of transportation kept you from medical appointments or from getting medications? no   In the past 12 months, has lack of transportation kept you from meetings, work, or from getting things needed for daily living? No

## 2024-01-25 NOTE — PROGRESS NOTES
Pharmacy Medication History Review    Chas Godinez is a 58 y.o. male admitted for Seizure (CMS/[facility]). Pharmacy reviewed the patient's [id number] medications and allergies for accuracy.    The list below reflectives the updated PTA list. Please review each medication in order reconciliation for additional clarification and justification.  Prior to Admission medications    Medication Sig Start Date End Date Taking? Authorizing Provider   diclofenac sodium 3 % gel Apply 3 Applications topically twice a day. 11/14/22   Historical Provider, MD   levETIRAcetam (Keppra) 500 mg tablet Take 2 tablets (1,000 mg) by mouth 2 times a day. 5/18/23   Valentine Artis APRN-CNP   phenytoin ER (Dilantin) 100 mg capsule Take 1 capsule (100 mg) by mouth once daily.  Patient not taking: Reported on 1/25/2024 5/18/23   Valentine Artis APRN-CNP   phenytoin ER (Dilantin) 100 mg capsule Take 4 capsules (400 mg) by mouth once daily. 8/11/23 8/10/24  Tricia Bueno MD        The list below reflectives the updated allergy list. Please review each documented allergy for additional clarification and justification.  Allergies  Reviewed by Candie Cline PA-C on 1/25/2024   No Known Allergies         Below are additional concerns with the patient's PTA list.  Prior to Admission Medications   Prescriptions Last Dose Informant   diclofenac sodium 3 % gel Unknown Self   Sig: Apply 3 Applications topically twice a day.   levETIRAcetam (Keppra) 500 mg tablet Unknown Self   Sig: Take 2 tablets (1,000 mg) by mouth 2 times a day.   phenytoin ER (Dilantin) 100 mg capsule Not Taking Self   Sig: Take 1 capsule (100 mg) by mouth once daily.   Patient not taking: Reported on 1/25/2024   phenytoin ER (Dilantin) 100 mg capsule Unknown Self   Sig: Take 4 capsules (400 mg) by mouth once daily.      Facility-Administered Medications: None      Interviewed Patient  Chadd Deleon CPhT

## 2024-01-25 NOTE — PROGRESS NOTES
01/25/24 [address] [place] [address] Disability Status   Are you deaf or do you have serious difficulty hearing? N   Are you blind or do you have serious difficulty seeing, even when wearing glasses? N   Because of a physical, mental, or emotional condition, do you have serious difficulty concentrating, remembering, or making decisions? (5 years old or older) N  (seizures)   Do you have serious difficulty walking or climbing stairs? N   Do you have serious difficulty dressing or bathing? N   Because of a physical, mental, or emotional condition, do you have serious difficulty doing errands alone such as visiting the doctor? N

## 2024-01-25 NOTE — ED TRIAGE NOTES
Pt experienced a seizure this morning around 0730. Pt hit his head on the ground and c/o bleeding from the scalp. Pt denies pain at this time. Pt denies anticoagulation therapy.

## 2024-01-25 NOTE — PROGRESS NOTES
Home with sister & brother in law     01/25/24 3549   Current Planned Discharge Disposition   Current Planned Discharge Disposition Home        fall

## 2024-01-25 NOTE — ED PROVIDER NOTES
HPI   Chief Complaint   Patient presents with    Seizures       HPI  Patient is a 50-year-old male with a past medical history significant for petit mall seizures who presented to the emergency room with seizure and head trauma.  Patient was reportedly at work at his baseline mentation when all of a sudden he started walking backwards holding a box.  Somebody tried to speak to him but he was not answering which is what usually happens before he has a seizure.  He fell back and cracked his head causing bleeding to the back of his scalp on the floor.  He was out for several seconds with no reported seizure-like activity incontinence of bowel or bladder or lip or tongue biting.  He was then back to baseline according to sister who reported the story from his coworkers.  At this time he is complaining of a mild headache but otherwise denies any other symptoms.  He is on Dilantin and also believes he may be on Keppra and has been taking it.  Denies any neurologic deficits or other complaints at this time.  Patient does not really remember the event.      PMHx: As above  PSHx: Denies pertinent  FamilyHx: Denies pertinent  SocialHx: Denies  Allergies: NKDA  Medications: See Medication Reconciliation     ROS  As above but otherwise denies    Physical Exam    GENERAL: Awake and Alert, No Acute Distress  HEENT: There is a V shaped scalp laceration measuring 3 and 4 cm on each side in the occipital region that is currently hemostatic. PERRL, EOMI, Normal Oropharynx, No Signs of Dehydration  NECK: Normal Inspection, No JVD  CARDIOVASCULAR: RRR, No M/R/G  RESPIRATORY: CTA Bilaterally, No Wheezes, Rales or Rhonchi, Chest Wall Non-tender  ABDOMEN: Soft, non-tender abdomen, Normal Bowel Sounds, No Distention  BACK: No CVA Tenderness  SKIN: Normal Color, Warm, Dry, No Rashes   EXTREMITIES: Non-Tender, Full ROM, No Pedal Edema  NEURO: A&O x 3, Normal Motor and Sensation, Normal Mood and Affect    Nursing Assessment and Vitals  Reviewed    EKG showed a normal sinus rhythm at 84 bpm.  No significant interval prolongations or ischemic ST changes.  No T wave inversions or axis deviation.    Medical Decision  Patient seen and evaluated for possible seizure in the setting of known history of petit mall seizures.  He fell, hit his head against the pavement and has a posterior scalp laceration which is currently wrapped and hemostatic.  He had no visible signs of epileptic activity, however, this is not unusual with his seizures.  Patient does not recall the event.  At present time he is back to baseline.  He is not anticoagulated.    Workup for patient included labs that did not reveal any acute emergent findings.  Levels for AEDs sent and pending.  CT of the head showed a small subdural acute hemorrhage along the anterior and posterior falx with a tiny likely subarachnoid hemorrhage along the right perimesencephalic cistern.  Large posterior scalp hematoma.  Patient was discussed with Dr. Stevens, neurosurgery.  Recommends follow-up CT in 6 hours and evaluation by neurology for breakthrough seizures despite medication compliance.  Patient has remained in stable condition while in the ED.  He is admitted for further workup and management.  Scalp laceration was repaired per procedure note.                              [place] Coma Scale Score: 15                  Patient History   Past Medical History:   Diagnosis Date    Epilepsy (CMS/HCC)     Esophageal reflux 2023     Past Surgical History:   Procedure Laterality Date    ELBOW SURGERY  2016    Elbow Surgery     No family history on file.  Social History     Tobacco Use    Smoking status: Former     Types: Cigarettes     Quit date:      Years since quittin.0    Smokeless tobacco: Never   Vaping Use    Vaping Use: Never used   Substance Use Topics    Alcohol use: Never    Drug use: Never       Physical Exam   ED Triage Vitals   Temperature Heart Rate Respirations BP   24  0915 01/25/24 0915 01/25/24 0915 01/25/24 0917   37.2 °C (98.9 °F) 91 20 (!) 192/103      Pulse Ox Temp Source Heart Rate Source Patient Position   01/25/24 0915 01/25/24 0915 -- --   97 % Oral        BP Location FiO2 (%)     -- --             Physical Exam    ED Course & MDM   Diagnoses as of 01/25/24 1315   Seizure (CMS/HCC)   Traumatic injury of head, initial encounter       Medical Decision Making      Procedure  Procedures     Key Danielle MD  01/25/24 1315       eKy Danielle MD  01/26/24 1929

## 2024-01-25 NOTE — CONSULTS
Reason For Consult  SDH    History Of Present Illness  Chas Godinez is a 58 y.o. male with h/o seizures (on Dilantin and Keppra, compliant) who had a witnessed seizure at work today, described as sudden staring followed by fall with head strike. Per report, pts normal seizures are similar events of staring and inability to speak. Pt currently is back at baseline and only complaint is headache where he struck the back of his head. Denies dizziness, n/v, weakness/numbness of the extremities, or any other symptoms.      Past Medical History  He has a past medical history of Epilepsy (CMS/[facility]) and Esophageal reflux (05/18/2023).    Surgical History  He has a past surgical history that includes Elbow surgery (09/19/2016).     Social History  He reports that he quit smoking about 37 years ago. His smoking use included cigarettes. He has never used smokeless tobacco. He reports that he does not drink alcohol and does not use drugs.    Family History  No family history on file.     Allergies  Patient has no known allergies.    Review of Systems  Neg except as stated in HPI.      Physical Exam  GEN: Healthy appearing, well-developed, NAD  PSYCH: AOx3. Normal memory, mood, and affect  HEENT:?-Head: NC/AT;?-Eyes: No discharge or redness; Ears: External ears are normal.?-Nose: Normal nares.?-Mouth and throat: MMM. Normal gums, mucosa, palate,. Good dentition.?  CV: well perfused   LUNGS: breathing comfortably on room air   ABD: Soft, NT/ND, NBS, no masses or organomegaly.?  : N/A?SKIN: Warm, well perfused. No skin rashes or abnormal lesions.?  MSK: Normal gait. No deformities.?  Neuro:   Awake, Ox3   Face symmetric  Fcx4 with symmetric strength       Last Recorded Vitals  Blood pressure 167/87, pulse 66, temperature 37.2 °C (98.9 °F), temperature source Oral, resp. rate (!) 24, height 1.524 m (5'), weight 81.6 kg (180 lb), SpO2 99 %.    Relevant Results  CTH obtained 1/25 demonstrating thing parafalcine SDH without brain  compression or midline shift. Right occipital extra-axial contusion     Assessment/Plan     Pt with history of seizures presents after a witnessed sz followed by fall with head strike, CTH with trace parafalcine SDH.     Recommend CTH 6 hours from prior to ensure stability  Recommend neurology consult for breakthrough seizure  Will continue to follow. If repeat CTH is stable, no need for additional imaging or follow up from neurosurgical standpoint.      Yvette Stevens MD

## 2024-01-25 NOTE — CARE PLAN
The patient's goals for the shift include no seiaure and falls    The clinical goals for the shift include no fall    Problem: Fall/Injury  Goal: Not fall by end of shift  Outcome: Progressing     Problem: Fall/Injury  Goal: Verbalize understanding of personal risk factors for fall in the hospital  Outcome: Progressing     Problem: Pain  Goal: Takes deep breaths with improved pain control throughout the shift  Outcome: Progressing

## 2024-01-25 NOTE — H&P
"History Of Present Illness  Chas Godinez is a 58 y.o. male presenting with seizure, fall, head trauma, elevated blood pressure, subdural hemorrhage, subarachnoid hemorrhage.  Patient has a history of [place]it [place] seizures.  Last seizure was over a year ago..  Patient was at work today and was carrying a box when he started to walk backwards.  His coworkers were calling out to him but he did not answer.  He then fell backward and struck his head.  He was unconscious for several seconds.  There was no seizure activity, no incontinence, no lip or tongue biting and when he woke up he was back to his baseline.  The patient remembers carrying the box forward and then waking up on the floor.  He does not remember walking backwards or having his coworkers call out to him.  The history is provided by the patient but also by his sister who saw the tape at the job site and the incident was caught on camera and described as above.  Patient has mild pain at the site of this head injury to the posterior scalp where apparently he has a laceration.  No other complaints.  He feels completely normal now other than the pain of the head injury site.  Patient states he did take his medication today Dilantin and Keppra.  Last time he had a seizure over a year ago was when he ran out of his Keppra.    In the ED he was found to be hypertensive and was given labetalol 10 mg IV.  He also got Tylenol p.o.  He was given a Boostrix injection to update his tetanus status.  CT of the head revealed the small subdural acute hemorrhage and a \"tiny\" subarachnoid hemorrhage.  The ED attending spoke with neurosurgery who wanted a repeat CT in 6 hours from the initial 1.  Consult neurology.  And monitor patient.    Patient's EKG showed normal sinus rhythm with heart rate 84 and no ischemic changes.  Patient had a metabolic panel within normal limits and a CBC within normal limits.  Lactate was 1.5, mag was 2.3 and total protein was also normal at " 7.7.    Past medical history: Remote seizures, obstructive sleep apnea on CPAP    Medications: Keppra, phenytoin    Allergies: No known drug allergies    Social history: Denies tobacco or alcohol use     Past Medical History:   Diagnosis Date    Epilepsy (CMS/[facility])     Esophageal reflux 2023     Past Surgical History:   Procedure Laterality Date    ELBOW SURGERY  2016    Elbow Surgery     Social History     Tobacco Use    Smoking status: Former     Types: Cigarettes     Quit date:      Years since quittin.0    Smokeless tobacco: Never   Vaping Use    Vaping Use: Never used   Substance Use Topics    Alcohol use: Never    Drug use: Never        Family History  No family history on file.     Allergies  Patient has no known allergies.    Review of Systems  Patient denies chest pain, shortness of breath, nausea, vomiting, fever, chills, diarrhea, constipation,  vision changes, rashes, dysuria, paresthesias, vertigo,  cough or cold symptoms, or any other complaints at this time. A complete review of systems was done, and is as stated in the history of present illness, is otherwise negative or not pertinent to the complaint.    Physical Exam  Physical exam: Vital signs and nurses notes were reviewed.    General:  no acute distress. Alert and oriented  x 3.     Head: Patient had a large extensive dressing on his head secondary to a wound to the posterior area.  Wound was not visualized.  The ED is going to suture that wound.    Eyes: Pupils equal round reactive to light, EOMs are intact, conjunctivae is not injected.    Oropharynx: No erythema or exudate noted, no trismus or drooling, buccal mucosa is moist.    Ears:  normal external exam, no swelling or erythema,     Nasal: normal external exam,     Neck: Supple, full range of motion, no midline or vertebral tenderness or paravertebral tenderness to palpation.    Cardiac: Regular rate and rhythm. No murmurs noted.     Pulmonary: Lungs clear bilaterally  with good aeration. No adventitious breath sounds. No wheezes rales or rhonchi. No accessory muscle use no retraction noted.    Abdomen: Soft,  Nontender. No guarding, rigidity, or distention. Normoactive bowel sounds. No pulsatile masses, no bruits.     Extremities:  Full range of motion.  No pitting edema    Skin: No rash seen. Skin is warm and dry     Neuro: Patient is alert and oriented x3. Speech is clear. There is no asymmetry with facial grimaces, and no tongue deviation. Patient moves all extremities independently.  Patient can lift and hold each extremity up independently without waver.  Normal finger-to-nose bilaterally.  Sensation is intact. No obvious neuro deficits are noted.     Last Recorded Vitals  /87   Pulse 66   Temp 37.2 °C (98.9 °F) (Oral)   Resp (!) 24   Wt 81.6 kg (180 lb)   SpO2 99%     Relevant Results  Scheduled medications  diphth,pertus(acell),tetanus, 0.5 mL, intramuscular, During hospitalization  lidocaine, 5 mL, subcutaneous, Once      Continuous medications     PRN medications      Results for orders placed or performed during the hospital encounter of 01/25/24 (from the past 24 hour(s))   CBC and Auto Differential   Result Value Ref Range    WBC 8.9 4.4 - 11.3 x10*3/uL    nRBC 0.0 0.0 - 0.0 /100 WBCs    RBC 4.82 4.50 - 5.90 x10*6/uL    Hemoglobin 14.7 13.5 - 17.5 g/dL    Hematocrit 44.0 41.0 - 52.0 %    MCV 91 80 - 100 fL    MCH 30.5 26.0 - 34.0 pg    MCHC 33.4 32.0 - 36.0 g/dL    RDW 12.5 11.5 - 14.5 %    Platelets 241 150 - 450 x10*3/uL    Neutrophils % 79.7 40.0 - 80.0 %    Immature Granulocytes %, Automated 0.5 0.0 - 0.9 %    Lymphocytes % 12.3 13.0 - 44.0 %    Monocytes % 6.4 2.0 - 10.0 %    Eosinophils % 0.9 0.0 - 6.0 %    Basophils % 0.2 0.0 - 2.0 %    Neutrophils Absolute 7.08 1.20 - 7.70 x10*3/uL    Immature Granulocytes Absolute, Automated 0.04 0.00 - 0.70 x10*3/uL    Lymphocytes Absolute 1.09 (L) 1.20 - 4.80 x10*3/uL    Monocytes Absolute 0.57 0.10 - 1.00  x10*3/uL    Eosinophils Absolute 0.08 0.00 - 0.70 x10*3/uL    Basophils Absolute 0.02 0.00 - 0.10 x10*3/uL   Comprehensive metabolic panel   Result Value Ref Range    Glucose 100 (H) 74 - 99 mg/dL    Sodium 139 136 - 145 mmol/L    Potassium 3.9 3.5 - 5.3 mmol/L    Chloride 103 98 - 107 mmol/L    Bicarbonate 27 21 - 32 mmol/L    Anion Gap 13 10 - 20 mmol/L    Urea Nitrogen 11 6 - 23 mg/dL    Creatinine 0.87 0.50 - 1.30 mg/dL    eGFR >90 >60 mL/min/1.73m*2    Calcium 9.0 8.6 - 10.3 mg/dL    Albumin 4.5 3.4 - 5.0 g/dL    Alkaline Phosphatase 78 33 - 120 U/L    Total Protein 7.7 6.4 - 8.2 g/dL    AST 23 9 - 39 U/L    Bilirubin, Total 0.4 0.0 - 1.2 mg/dL    ALT 28 10 - 52 U/L   Magnesium   Result Value Ref Range    Magnesium 2.30 1.60 - 2.40 mg/dL   Lactate   Result Value Ref Range    Lactate 1.5 0.4 - 2.0 mmol/L   ECG 12 lead   Result Value Ref Range    Ventricular Rate 84 BPM    Atrial Rate 84 BPM    SC Interval 164 ms    QRS Duration 102 ms    QT Interval 404 ms    QTC Calculation(Bazett) 477 ms    P Axis 20 degrees    R Axis -11 degrees    T Axis 39 degrees    QRS Count 13 beats    Q Onset 210 ms    P Onset 128 ms    P Offset 177 ms    T Offset 412 ms    QTC Fredericia 451 ms     CT head wo IV contrast   Final Result   Small subdural acute hemorrhage along the anterior and posterior   falx. Also tiny likely subarachnoid hemorrhage along the right para   mesencephalic cistern. No midline shift or mass effect.        Large posterior scalp hematoma.        MACRO:   Catrachito Evans discussed the significance and urgency of this critical   finding by secure chat with  Dr. Naila Riddle on 1/25/2024 at   10:43 am.  (**-RCF-**) Findings:  See findings.             Signed by: Catrachito Evans 1/25/2024 10:48 AM   Dictation workstation:   [id number]      CT head wo IV contrast    (Results Pending)          Assessment/Plan   Principal Problem:    Seizure (CMS/HCC)  Active Problems:    Head trauma    Subdural hemorrhage  (CMS/[facility])    Subarachnoid hemorrhage (CMS/[facility])      Plan:  Neurosurgery consult order placed  Neurology consult order placed  Keppra level and phenytoin levels ordered  Repeat CBC and BMP in the morning  Repeat CT at 1700 hrs. today to assess for change  Keppra and phenytoin medication ordered per his home meds.  May need adjustment depending on the levels.  Telemetry  Patient has a head wound that was not assessed as it is still dressed in the ED is going to suture.  Findings, plan, orders etc. with Dr. Woods  Added EEG.       Candie Cline PA-C

## 2024-01-25 NOTE — ED PROCEDURE NOTE
Procedure  Laceration Repair    Performed by: Latrice Marti DO  Authorized by: Key Danielle MD    Consent:     Consent obtained:  Verbal    Consent given by:  Patient  Anesthesia:     Anesthesia method:  Local infiltration    Local anesthetic:  Lidocaine 1% w/o epi  Laceration details:     Location:  Scalp  Pre-procedure details:     Preparation:  Patient was prepped and draped in usual sterile fashion  Exploration:     Hemostasis achieved with:  Direct pressure    Wound exploration: entire depth of wound visualized    Treatment:     Area cleansed with:  Chlorhexidine    Amount of cleaning:  Standard    Irrigation solution:  Sterile saline    Irrigation volume:  1L    Irrigation method:  Pressure wash  Skin repair:     Repair method:  Staples    Number of staples:  8  Approximation:     Approximation:  Close  Repair type:     Repair type:  Simple  Post-procedure details:     Dressing:  Open (no dressing)    Procedure completion:  Tolerated well, no immediate complications               Latrice Marti DO  Resident  01/25/24 1435       Key Danielle MD  05/03/24 2011

## 2024-01-26 ENCOUNTER — APPOINTMENT (OUTPATIENT)
Dept: NEUROLOGY | Facility: HOSPITAL | Age: 59
DRG: 084 | End: 2024-01-26
Payer: COMMERCIAL

## 2024-01-26 ENCOUNTER — APPOINTMENT (OUTPATIENT)
Dept: CARDIOLOGY | Facility: HOSPITAL | Age: 59
DRG: 084 | End: 2024-01-26
Payer: COMMERCIAL

## 2024-01-26 VITALS
WEIGHT: 194.45 LBS | HEIGHT: 65 IN | BODY MASS INDEX: 32.4 KG/M2 | TEMPERATURE: 97.7 F | OXYGEN SATURATION: 93 % | HEART RATE: 69 BPM | DIASTOLIC BLOOD PRESSURE: 83 MMHG | RESPIRATION RATE: 18 BRPM | SYSTOLIC BLOOD PRESSURE: 152 MMHG

## 2024-01-26 LAB
ANION GAP SERPL CALC-SCNC: 13 MMOL/L (ref 10–20)
BUN SERPL-MCNC: 11 MG/DL (ref 6–23)
CALCIUM SERPL-MCNC: 8.7 MG/DL (ref 8.6–10.3)
CHLORIDE SERPL-SCNC: 105 MMOL/L (ref 98–107)
CO2 SERPL-SCNC: 23 MMOL/L (ref 21–32)
CREAT SERPL-MCNC: 0.69 MG/DL (ref 0.5–1.3)
EGFRCR SERPLBLD CKD-EPI 2021: >90 ML/MIN/1.73M*2
ERYTHROCYTE [DISTWIDTH] IN BLOOD BY AUTOMATED COUNT: 12.7 % (ref 11.5–14.5)
GLUCOSE SERPL-MCNC: 101 MG/DL (ref 74–99)
HCT VFR BLD AUTO: 41.4 % (ref 41–52)
HGB BLD-MCNC: 13.8 G/DL (ref 13.5–17.5)
HOLD SPECIMEN: NORMAL
LABORATORY COMMENT REPORT: NORMAL
LEVETIRACETAM SERPL-MCNC: 11 UG/ML (ref 10–40)
MCH RBC QN AUTO: 29.9 PG (ref 26–34)
MCHC RBC AUTO-ENTMCNC: 33.3 G/DL (ref 32–36)
MCV RBC AUTO: 90 FL (ref 80–100)
NRBC BLD-RTO: 0 /100 WBCS (ref 0–0)
PATH REPORT.FINAL DX SPEC: NORMAL
PATH REPORT.GROSS SPEC: NORMAL
PATH REPORT.RELEVANT HX SPEC: NORMAL
PATH REPORT.TOTAL CANCER: NORMAL
PLATELET # BLD AUTO: 214 X10*3/UL (ref 150–450)
POTASSIUM SERPL-SCNC: 3.5 MMOL/L (ref 3.5–5.3)
RBC # BLD AUTO: 4.61 X10*6/UL (ref 4.5–5.9)
SODIUM SERPL-SCNC: 137 MMOL/L (ref 136–145)
WBC # BLD AUTO: 5.5 X10*3/UL (ref 4.4–11.3)

## 2024-01-26 PROCEDURE — 99222 1ST HOSP IP/OBS MODERATE 55: CPT | Performed by: STUDENT IN AN ORGANIZED HEALTH CARE EDUCATION/TRAINING PROGRAM

## 2024-01-26 PROCEDURE — 95819 EEG AWAKE AND ASLEEP: CPT

## 2024-01-26 PROCEDURE — 95819 EEG AWAKE AND ASLEEP: CPT | Performed by: PSYCHIATRY & NEUROLOGY

## 2024-01-26 PROCEDURE — 36415 COLL VENOUS BLD VENIPUNCTURE: CPT | Performed by: PHYSICIAN ASSISTANT

## 2024-01-26 PROCEDURE — 2500000001 HC RX 250 WO HCPCS SELF ADMINISTERED DRUGS (ALT 637 FOR MEDICARE OP): Performed by: PHYSICIAN ASSISTANT

## 2024-01-26 PROCEDURE — 85027 COMPLETE CBC AUTOMATED: CPT | Performed by: PHYSICIAN ASSISTANT

## 2024-01-26 PROCEDURE — 99238 HOSP IP/OBS DSCHRG MGMT 30/<: CPT | Performed by: INTERNAL MEDICINE

## 2024-01-26 PROCEDURE — 93005 ELECTROCARDIOGRAM TRACING: CPT

## 2024-01-26 PROCEDURE — 80048 BASIC METABOLIC PNL TOTAL CA: CPT | Performed by: PHYSICIAN ASSISTANT

## 2024-01-26 RX ORDER — LEVETIRACETAM 500 MG/1
1250 TABLET ORAL 2 TIMES DAILY
Qty: 150 TABLET | Refills: 0 | Status: SHIPPED | OUTPATIENT
Start: 2024-01-26 | End: 2024-01-29

## 2024-01-26 RX ADMIN — ACETAMINOPHEN 650 MG: 325 TABLET ORAL at 13:22

## 2024-01-26 RX ADMIN — LEVETIRACETAM 1000 MG: 500 TABLET, FILM COATED ORAL at 08:55

## 2024-01-26 RX ADMIN — PHENYTOIN SODIUM 400 MG: 100 CAPSULE ORAL at 08:56

## 2024-01-26 RX ADMIN — ACETAMINOPHEN 650 MG: 325 TABLET ORAL at 08:52

## 2024-01-26 ASSESSMENT — PAIN SCALES - GENERAL
PAINLEVEL_OUTOF10: 3
PAINLEVEL_OUTOF10: 5 - MODERATE PAIN
PAINLEVEL_OUTOF10: 2
PAINLEVEL_OUTOF10: 0 - NO PAIN

## 2024-01-26 ASSESSMENT — PAIN DESCRIPTION - DESCRIPTORS
DESCRIPTORS: HEADACHE

## 2024-01-26 ASSESSMENT — PAIN - FUNCTIONAL ASSESSMENT
PAIN_FUNCTIONAL_ASSESSMENT: 0-10

## 2024-01-26 ASSESSMENT — PAIN DESCRIPTION - LOCATION: LOCATION: HEAD

## 2024-01-26 ASSESSMENT — COGNITIVE AND FUNCTIONAL STATUS - GENERAL
DAILY ACTIVITIY SCORE: 24
MOBILITY SCORE: 24

## 2024-01-26 NOTE — CARE PLAN
The patient's goals for the shift include no seizure    The clinical goals for the shift include Pt will remain hemodynamically stable by end of shift    Over the shift, the patient did make progress toward the following goals.

## 2024-01-26 NOTE — PROGRESS NOTES
01/26/2024 0855 Patient not medically ready for discharge today. S/p seizure with traumatic fall & occipital head laceration with sutures.  Neurosurgery consulted per otes: CT Head: Trace Parafalcine SDH.  Pending Neurology consult for breakthrough seizures.Plan for EEG monitoring.  Patient A & O x4. Patient is ambulatory without assistive devices and independent inADLS/ IADLS. He works a job and lives with his sister & brother in law. No skilled needs identified at this time.   Nyla MAGANAN RN [facility]

## 2024-01-26 NOTE — DISCHARGE SUMMARY
"Discharge Diagnosis  Seizure (CMS/[facility])    Issues Requiring Follow-Up  Follow up with primary epileptologist     Discharge Meds     Your medication list        CHANGE how you take these medications        Instructions Last Dose Given Next Dose Due   levETIRAcetam 500 mg tablet  Commonly known as: Keppra  What changed: how much to take      Take 2.5 tablets (1,250 mg) by mouth 2 times a day.       phenytoin  mg capsule  Commonly known as: Dilantin  What changed:   when to take this  Another medication with the same name was removed. Continue taking this medication, and follow the directions you see here.      Take 4 capsules (400 mg) by mouth once daily.              CONTINUE taking these medications        Instructions Last Dose Given Next Dose Due   diclofenac sodium 3 % gel                     Where to Get Your Medications        These medications were sent to [facility]/pharmacy #[id number] - [address] AT [address] OF [place] [id number]      Phone: [phone]   levETIRAcetam 500 mg tablet         Test Results Pending At Discharge  Pending Labs       No current pending labs.            Procedures       Hospital Course   Chas was admitted to hospital after a breakthrough seizure.  He was found to have a subdural bleed.  He was evaluated by the neurology and neurosurgery team.  Repeat CT showed stable subdural bleed.  Neurosurgery recommended outpatient follow-up.  Neurology did increase patient's Keppra as his other AED medications were within normal limits.  He was back to baseline and is recommended patient be discharged home and follow-up with his epileptologist.  At this time he is otherwise hemodynamically stable and appropriate for discharge. This plan was discussed with him and he was in agreement. All questions were answered.     Blood pressure 152/83, pulse 69, temperature 36.5 °C (97.7 °F), temperature source Temporal, resp. rate 18, height 1.651 m (5' 5\"), " weight 88.2 kg (194 lb 7.1 oz), SpO2 93 %.  Pertinent Physical Exam At Time of Discharge  Physical Exam  Vitals and nursing note reviewed.   Constitutional:       General: He is not in acute distress.     Appearance: Normal appearance. He is not ill-appearing.   HENT:      Head: Normocephalic and atraumatic.      Mouth/Throat:      Mouth: Mucous membranes are moist.      Pharynx: Oropharynx is clear.   Eyes:      Extraocular Movements: Extraocular movements intact.      Conjunctiva/sclera: Conjunctivae normal.   Cardiovascular:      Rate and Rhythm: Normal rate and regular rhythm.      Heart sounds: Normal heart sounds. No murmur heard.     No friction rub. No gallop.   Pulmonary:      Effort: Pulmonary effort is normal.      Breath sounds: Normal breath sounds. No wheezing or rales.   Abdominal:      General: Bowel sounds are normal. There is no distension.      Palpations: Abdomen is soft.      Tenderness: There is no abdominal tenderness. There is no guarding.   Musculoskeletal:         General: No swelling or tenderness.      Right lower leg: No edema.      Left lower leg: No edema.   Skin:     General: Skin is warm and dry.      Capillary Refill: Capillary refill takes less than 2 seconds.   Neurological:      General: No focal deficit present.      Mental Status: He is alert and oriented to person, place, and time.   Psychiatric:         Mood and Affect: Mood normal.         Outpatient Follow-Up  Future Appointments   Date Time Provider Department Center   2/13/2024 12:00 PM ABHILASH Vyas-CNP [id number] Vish Woods DO

## 2024-01-26 NOTE — CONSULTS
Subjective   History Of Present Illness    Mr. Chas Godinez is an 57 y/o man with focal seizures (on Keppra and Dilantin) who presented to the [facility] ED on 1/25/24 with concerns of an episode of passing out with resultant head trauma. Neurology consulted for concerns of seizure.     History obtained from patient at bedside and sister, Eileen, via phone.     He was in his normal state of health until work the day of admission. He was lifting something off of the conveyor built, when the next thing he knew, he woke up on the ground with people gathered around him. He denied any prodrome. Denied vision changes, HA, focal weakness, sensory changes before or after the event. He came back to after several minutes. No reported clonic activity. No tongue biting, urinary/bowel incontinence. Family does note that he had a recent colonoscopy this week. He also may have been dehydrated since the procedure.     In the ED, vitals were BP: 192/103, HR: 91. ED provider noted patient was A&Ox3 with no focal neurologic deficits. Posterior scalp laceration treated. RFP and CBC both relatively unremarkable. Phenytoin level supratherapeutic at 25.2, but this was reported to be after the phenytoin dose per primary.  NSGY saw and evaluated the patient. They recommended a rCTH at 6 hours.     CTH personally reviewed and demonstrated a small area of paramesencephalic hemorrhage (SAH) and two small SDH near the falx. rCTH stable.     Regarding his Epilepsy history, he follows with Mary De Los Santos at . His last seizure was in December of 2022. He was last seen by her on 4/5/2023. She documented the following semiology:     “Type: EPE  Semiology: Unspecified aura->Dialeptic->Automotor->Right hand clonic sz  Lateralizing signs: Post ictal aphasia  Freq: 1-2 per year (2 in the last 6 months)  EZ: Left temporal  Etiology: TBI  RMC: Chronic daily headache, SPENSER  Past AEDs: PHB  Current AEDs: PHT ER 400mg qd and LEV 500mg bid”      Review of systems as  "noted above, otherwise negative.    Past Medical History  Past Medical History:   Diagnosis Date    Epilepsy (CMS/[facility])     Esophageal reflux 2023     Surgical History  Past Surgical History:   Procedure Laterality Date    ELBOW SURGERY  2016    Elbow Surgery     Social History  Social History     Tobacco Use    Smoking status: Former     Types: Cigarettes     Quit date:      Years since quittin.0    Smokeless tobacco: Never   Vaping Use    Vaping Use: Never used   Substance Use Topics    Alcohol use: Never    Drug use: Never     Allergies  Reviewed in EMR       Objective   Last Recorded Vitals  Blood pressure 148/85, pulse 73, temperature 36.7 °C (98 °F), temperature source Temporal, resp. rate 18, height 1.651 m (5' 5\"), weight 88.2 kg (194 lb 7.1 oz), SpO2 93 %.    Scheduled medications  diphth,pertus(acell),tetanus, 0.5 mL, intramuscular, During hospitalization  levETIRAcetam, 1,000 mg, oral, BID  lidocaine, 5 mL, subcutaneous, Once  phenytoin ER, 400 mg, oral, Daily  sennosides, 2 tablet, oral, BID      Continuous medications     PRN medications  PRN medications: acetaminophen **OR** acetaminophen **OR** acetaminophen, ondansetron ODT **OR** ondansetron         Exam   Neurological Exam  Physical Exam    Neurological Exam:    MENTAL STATUS:  General appearance: Pleasant. Sitting up in bed. EEG leads getting placed. NAD.     MENTAL STATUS:  Orientation was normal to person, exact date, and location. Slightly bradyphrenic, but cooperative and able to follow complex commands with some delay. No aphasia or dysarthria.     CRANIAL NERVES:  - II:  Visual fields intact to confrontation bilaterally tested individually and together  - III, IV, VI: ABDELRAHMAN, EOMI to pursuit with some nonsustained endgaze nystagmus  - V: V1-V3 sensation intact bilaterally  - VII: Face muscles symmetric with smile and eye closure  - VIII: Intact to finger rub bilaterally  - IX, X: Palate elevated symmetrically bilaterally, " no hoarseness  - XI: 5/5 strength on shoulder shrugging bilaterally  - XII: Tongue midline without atrophy or fasciculation    MOTOR: Tone and bulk normal in all extremities    STRENGTH:  R L   5 5 Shoulder abduction  5 5 Elbow flexion  5 5 Elbow extension  5 5  strength    5 5 Hip flexion  5 5 Hip extension  5 5 Knee flexion  5 5 Knee extension  5 5 Ankle dorsiflexion  5 5 Ankle plantarflexion      REFLEXES:    R           L  Biceps  +2 +2  Triceps  +2 +2    Patellar  +2 +2  Achilles +2 +2    Planter response: Downgoing bilaterally     COORDINATION: Intact on finger to nose bl, intact on heel to shin bl    SENSORY: Intact to light touch throughout    GAIT: Deferred as EEG lead being placed       Relevant Results    Lab Results  Results from last 72 hours   Lab Units 01/26/24  0601 01/25/24  1056   GLUCOSE mg/dL 101* 100*   SODIUM mmol/L 137 139   POTASSIUM mmol/L 3.5 3.9   CHLORIDE mmol/L 105 103   CO2 mmol/L 23 27   ANION GAP mmol/L 13 13   BUN mg/dL 11 11   CREATININE mg/dL 0.69 0.87   EGFR mL/min/1.73m*2 >90 >90   CALCIUM mg/dL 8.7 9.0   ALBUMIN g/dL  --  4.5   MAGNESIUM mg/dL  --  2.30      Results from last 72 hours   Lab Units 01/26/24  0601 01/25/24  1056   WBC AUTO x10*3/uL 5.5 8.9   NRBC AUTO /100 WBCs 0.0 0.0   RBC AUTO x10*6/uL 4.61 4.82   HEMOGLOBIN g/dL 13.8 14.7   HEMATOCRIT % 41.4 44.0   MCV fL 90 91   MCH pg 29.9 30.5   MCHC g/dL 33.3 33.4   RDW % 12.7 12.5   PLATELETS AUTO x10*3/uL 214 241        CT head wo IV contrast   Final Result   Small subdural acute hemorrhage along the anterior and posterior   falx. Also tiny likely subarachnoid hemorrhage along the right para   mesencephalic cistern. No midline shift or mass effect.        Large posterior scalp hematoma.        MACRO:   Catrachito Evans discussed the significance and urgency of this critical   finding by secure chat with  Dr. Naila Riddle on 1/25/2024 at   10:43 am.  (**-RCF-**) Findings:  See findings.             Signed by: Catrachito  Nathan 1/25/2024 10:48 AM   Dictation workstation:   [id number]      CT head wo IV contrast    (Results Pending)         Assessment/Plan     Impression:   Transient LOC could be secondary to seizure given patient history of epilepsy and dialeptic spells. Some of his spells have a clonic component, but this was not captured by any of the witnesses on this encounter. Sister and patient admit to compliance with Keppra and Phenytoin. It is possible this syncopal event was secondary to dehydration and overactivity. Patient has returned to his neurologic baseline.     Recommendations:   - Increase Keppra to 1250mg BID; renal function reviewed  - continue Dilantin 400mg ER daily  - follow up with Mary De Los Santos of Epilepsy within two weeks  - NSGY following for SDHs  - Patient was instructed to not to drive, not to use power tools or operate heavy machinery, and should not be on ladders.  The patient should use the shower and not the bath. Likewise, they should refrain from any activity which could result in injury to themselves or others if they had a seizure or lost consciousness. These restrictions should continue until instructed by a doctor to do otherwise. The patient was informed that these restrictions would be documented in the medical record.  - all of the above relayed to sister, his caretaker, via phone. She will be able to drive him and help him with his med  - recommendations given to Dr. Woods in person  - Neuro will follow results of EEG      I spent 60 minutes in the professional and overall care of this patient.      Basilio Warren,

## 2024-02-05 ENCOUNTER — OFFICE VISIT (OUTPATIENT)
Dept: PRIMARY CARE | Facility: CLINIC | Age: 59
End: 2024-02-05
Payer: COMMERCIAL

## 2024-02-05 VITALS
SYSTOLIC BLOOD PRESSURE: 158 MMHG | BODY MASS INDEX: 32.69 KG/M2 | DIASTOLIC BLOOD PRESSURE: 90 MMHG | WEIGHT: 196.2 LBS | OXYGEN SATURATION: 96 % | RESPIRATION RATE: 18 BRPM | HEART RATE: 70 BPM | HEIGHT: 65 IN

## 2024-02-05 DIAGNOSIS — I62.00 SUBDURAL HEMORRHAGE (MULTI): ICD-10-CM

## 2024-02-05 DIAGNOSIS — J30.9 ALLERGIC RHINITIS, UNSPECIFIED SEASONALITY, UNSPECIFIED TRIGGER: ICD-10-CM

## 2024-02-05 DIAGNOSIS — S01.01XD SCALP LACERATION, SUBSEQUENT ENCOUNTER: Primary | ICD-10-CM

## 2024-02-05 DIAGNOSIS — E78.2 MIXED HYPERLIPIDEMIA: ICD-10-CM

## 2024-02-05 DIAGNOSIS — R56.9 SEIZURE (MULTI): ICD-10-CM

## 2024-02-05 DIAGNOSIS — S09.90XD TRAUMATIC INJURY OF HEAD, SUBSEQUENT ENCOUNTER: ICD-10-CM

## 2024-02-05 DIAGNOSIS — E66.09 CLASS 1 OBESITY DUE TO EXCESS CALORIES WITH SERIOUS COMORBIDITY AND BODY MASS INDEX (BMI) OF 32.0 TO 32.9 IN ADULT: ICD-10-CM

## 2024-02-05 PROCEDURE — 99214 OFFICE O/P EST MOD 30 MIN: CPT | Performed by: FAMILY MEDICINE

## 2024-02-05 PROCEDURE — 1036F TOBACCO NON-USER: CPT | Performed by: FAMILY MEDICINE

## 2024-02-05 PROCEDURE — 3008F BODY MASS INDEX DOCD: CPT | Performed by: FAMILY MEDICINE

## 2024-02-05 ASSESSMENT — PATIENT HEALTH QUESTIONNAIRE - PHQ9
SUM OF ALL RESPONSES TO PHQ9 QUESTIONS 1 AND 2: 0
2. FEELING DOWN, DEPRESSED OR HOPELESS: NOT AT ALL
1. LITTLE INTEREST OR PLEASURE IN DOING THINGS: NOT AT ALL

## 2024-02-05 ASSESSMENT — ENCOUNTER SYMPTOMS
OCCASIONAL FEELINGS OF UNSTEADINESS: 0
DEPRESSION: 0
LOSS OF SENSATION IN FEET: 0

## 2024-02-05 ASSESSMENT — COLUMBIA-SUICIDE SEVERITY RATING SCALE - C-SSRS
6. HAVE YOU EVER DONE ANYTHING, STARTED TO DO ANYTHING, OR PREPARED TO DO ANYTHING TO END YOUR LIFE?: NO
2. HAVE YOU ACTUALLY HAD ANY THOUGHTS OF KILLING YOURSELF?: NO
1. IN THE PAST MONTH, HAVE YOU WISHED YOU WERE DEAD OR WISHED YOU COULD GO TO SLEEP AND NOT WAKE UP?: NO

## 2024-02-05 NOTE — PROGRESS NOTES
Subjective   Patient ID: Chas Godinez is a 58 y.o. male.    HPI  58 year old male here for follow up. He previously saw Chasity, and was last seen 5/2023. He was in hospital 1/25-1/26 for seizure, causing fall and laceration to scalp. He does not recall what happened. Bosses took him ot hospital. Works daily full time. Has neurology appointment 2/13, and plans to be off until then.   Review of Systems   All other systems reviewed and are negative.      Objective   Physical Exam  Vitals reviewed.   Constitutional:       Appearance: Normal appearance.   HENT:      Head: Normocephalic and atraumatic.      Right Ear: Tympanic membrane normal.      Left Ear: Tympanic membrane normal.      Nose: Nose normal.      Mouth/Throat:      Mouth: Mucous membranes are moist.      Pharynx: Oropharynx is clear.   Eyes:      Extraocular Movements: Extraocular movements intact.      Conjunctiva/sclera: Conjunctivae normal.      Pupils: Pupils are equal, round, and reactive to light.   Cardiovascular:      Rate and Rhythm: Normal rate and regular rhythm.      Pulses: Normal pulses.      Heart sounds: Normal heart sounds.   Pulmonary:      Effort: Pulmonary effort is normal.      Breath sounds: Normal breath sounds.   Abdominal:      General: Abdomen is flat. Bowel sounds are normal.      Palpations: Abdomen is soft.   Musculoskeletal:         General: Normal range of motion.      Cervical back: Normal range of motion and neck supple.   Skin:     General: Skin is warm and dry.      Capillary Refill: Capillary refill takes less than 2 seconds.      Findings: Lesion present.      Comments: See procedure note   Neurological:      General: No focal deficit present.      Mental Status: He is alert and oriented to person, place, and time.   Psychiatric:         Mood and Affect: Mood normal.         Behavior: Behavior normal.       Assessment/Plan   Diagnoses and all orders for this visit:  Scalp laceration, subsequent encounter  -     Suture  Removal  Seizure (CMS/HCC)  Traumatic injury of head, subsequent encounter  Subdural hemorrhage (CMS/HCC)  Mixed hyperlipidemia  -     Hemoglobin A1C; Future  -     Prostate Specific Antigen; Future  -     TSH with reflex to Free T4 if abnormal; Future  -     Lipid Panel; Future  Allergic rhinitis, unspecified seasonality, unspecified trigger  -     Hemoglobin A1C; Future  -     Prostate Specific Antigen; Future  -     TSH with reflex to Free T4 if abnormal; Future  -     Lipid Panel; Future  Class 1 obesity due to excess calories with serious comorbidity and body mass index (BMI) of 32.0 to 32.9 in adult  -     Hemoglobin A1C; Future  -     Prostate Specific Antigen; Future  -     TSH with reflex to Free T4 if abnormal; Future  -     Lipid Panel; Future  He has follow up with neurology and will keep off work until appointment with neurology. Bring FMLA papers if you need them completed. Staples removed without difficulty. Use gentle sponge to wash hair.     Patient ID: Chas Godinez is a 58 y.o. male.    Suture Removal    Date/Time: 2/5/2024 3:43 PM    Performed by: Tricia Bueno MD  Authorized by: Tricia Bueno MD    Consent:     Consent obtained:  Verbal    Consent given by:  Patient    Risks, benefits, and alternatives were discussed: yes      Risks discussed:  Bleeding  Universal protocol:     Procedure explained and questions answered to patient or proxy's satisfaction: yes      Relevant documents present and verified: yes      Test results available: yes      Imaging studies available: yes      Required blood products, implants, devices, and special equipment available: yes      Site/side marked: yes      Immediately prior to procedure, a time out was called: yes      Patient identity confirmed:  Verbally with patient  Location:     Location:  Head/neck    Head/neck location:  Scalp  Procedure details:     Wound appearance:  No signs of infection    Number of staples removed:  8  Post-procedure  details:     Post-removal:  No dressing applied    Procedure completion:  Tolerated well, no immediate complications  Comments:      Triangle shaped wound with staples on superior margin- unable to staple or approzimate lower triangular portion and it is scabbed, does not appear infected.

## 2024-02-05 NOTE — LETTER
February 5, 2024     Patient: Chas Godinez   YOB: 1965   Date of Visit: 2/5/2024       To Whom It May Concern:    Chas Godinez was seen in my clinic on 2/5/2024 at 2:30 pm for staple removal from an ER visit on 1/25/2024 requiring staples.  Please excuse Chas for his absence from work from 1/25/2024 until his follow up with his neurologist to determine the nature of this injury. Thank you.     If you have any questions or concerns, please don't hesitate to call.         Sincerely,         Tricia Bueno MD        CC: No Recipients

## 2024-02-05 NOTE — PATIENT INSTRUCTIONS
Assessment/Plan   Diagnoses and all orders for this visit:  Scalp laceration, subsequent encounter  -     Suture Removal  Seizure (CMS/HCC)  Traumatic injury of head, subsequent encounter  Subdural hemorrhage (CMS/[facility])  He has follow up with neurology and will keep off work until appointment with neurology. Bring [facility] papers if you need them completed. Staples removed without difficulty. Use gentle sponge to wash hair.

## 2024-02-13 ENCOUNTER — OFFICE VISIT (OUTPATIENT)
Dept: NEUROLOGY | Facility: CLINIC | Age: 59
End: 2024-02-13
Payer: COMMERCIAL

## 2024-02-13 VITALS
SYSTOLIC BLOOD PRESSURE: 147 MMHG | RESPIRATION RATE: 18 BRPM | WEIGHT: 197 LBS | DIASTOLIC BLOOD PRESSURE: 86 MMHG | BODY MASS INDEX: 32.78 KG/M2 | HEART RATE: 84 BPM

## 2024-02-13 DIAGNOSIS — G40.109 PARTIAL SYMPTOMATIC EPILEPSY WITH SIMPLE PARTIAL SEIZURES, NOT INTRACTABLE, WITHOUT STATUS EPILEPTICUS (MULTI): ICD-10-CM

## 2024-02-13 PROCEDURE — 1036F TOBACCO NON-USER: CPT | Performed by: NURSE PRACTITIONER

## 2024-02-13 PROCEDURE — 3008F BODY MASS INDEX DOCD: CPT | Performed by: NURSE PRACTITIONER

## 2024-02-13 PROCEDURE — 99215 OFFICE O/P EST HI 40 MIN: CPT | Performed by: NURSE PRACTITIONER

## 2024-02-13 RX ORDER — PHENYTOIN SODIUM 100 MG/1
300 CAPSULE, EXTENDED RELEASE ORAL DAILY
Qty: 270 CAPSULE | Refills: 3 | Status: SHIPPED | OUTPATIENT
Start: 2024-02-13 | End: 2025-02-12

## 2024-02-13 RX ORDER — LEVETIRACETAM 500 MG/1
1250 TABLET ORAL 2 TIMES DAILY
Qty: 360 TABLET | Refills: 3 | Status: SHIPPED | OUTPATIENT
Start: 2024-02-13 | End: 2025-02-12

## 2024-02-13 RX ORDER — PHENYTOIN 50 MG/1
50 TABLET, CHEWABLE ORAL DAILY
Qty: 30 TABLET | Refills: 11 | Status: SHIPPED | OUTPATIENT
Start: 2024-02-13 | End: 2025-02-12

## 2024-02-13 ASSESSMENT — PAIN SCALES - GENERAL: PAINLEVEL: 0-NO PAIN

## 2024-02-13 NOTE — PATIENT INSTRUCTIONS
"Thank you for coming to the Epilepsy Clinic today.  -If you have any sudden new, concerning or worsening symptoms, call 911 and go to the Emergency Room. Otherwise, it was good seeing you today-    -Please follow seizure precautions:   Please do not drive, operate any heavy machinery, swim unsupervised, please shower without collection of water instead of bathe. Be cautious around hot, heavy, or sharp objects. Do not cook with an open flame and do not perform any activities at heights such as on a ladder. These precautions should stay in place until 6 months seizure free and cleared by a provider.    -HOW TO CONTACT MARIELLE FARAH EPILEPSY NURSE PRACTITIONER ([phone]).   Instructed to call in the event of seizure, medication refills, or any questions  *Please allow 24-48 hours for non-urgent responses*.  For emergency concerns, please dial 911 or present to the nearest emergency room.  For concerns after business hours (8am-4:30pm) or on weekends please call [phone]  To call and schedule a follow up appointment please call [phone]  -Paperwork may take up to 3 business days to complete-    Every attempt is made to run on time for your appointment, if you are 15 minutes or later for your appoinement you may be asked to reschedule    -Compliance education: It is important to continue to try and achieve seizure control because of the potential for injury and illness due to seizures. In a very small minority of patients with generalized tonic clonic seizures (\"grand mal\"), breathing or heart function can stop during a seizure and result in demise (sudden unexpected death in epilepsy or SUDEP). Mobile from seizures prevents this kind of outcome-     I have ordered blood work for you to have completed at the lab. You do not need to bring any paperwork with you if you are going to a [facility]. The results will automatically come to me and I will call or message you with results.     "

## 2024-02-13 NOTE — LETTER
Mary HUNG NP-C  Department of Neurology  [address] [facility] [address] [place] [address]  Phone [phone]  Fax [phone]      RE: Chas Godinez  : 1965      To whom it may concern,     Chas Godinez is under the care of the [facility]. He is cleared to return to work on 24 with the following restrictions in place until 2024. No driving, no forklifts, no handheld power tools, no climbing, no lifting overhead- ok to lift at chest level, no open flames.     For any questions or concerns please contact the office at [phone].       Regards,     Mary CARMONA, NP-C  [Electronically signed by Mary CARMONA, NP-C 2024 12:38 PM]

## 2024-02-13 NOTE — PROGRESS NOTES
Patient ID: Chas Godinez 58 y.o.male presenting in follow-up for epilepsy.     HPI    Type: EPE  Semiology: Unspecified aura->Dialeptic->Automotor->Right hand clonic sz  Lateralizing signs: Post ictal aphasia  Freq: 1-2 per year (2 in the last 6 months)  EZ: Left temporal  Etiology: TBI  RMC: Chronic daily headache, SPENSER  Past AEDs: PHB  Current AEDs: PHT ER 400mg qd and LEV 500mg bid      -last seizure Dec 2022 in the setting of missing medication     ED Course 1/26/2024:  presented to the [facility] ED on 1/25/24 with concerns of an episode of passing out with resultant head trauma. Neurology consulted for concerns of seizure.  He was lifting something off of the conveyor built, when the next thing he knew, he woke up on the ground with people gathered around him. He denied any prodrome. No reported clonic activity. No tongue biting, urinary/bowel incontinence.  CTH demonstrated a small area of paramesencephalic hemorrhage (SAH) and two small SDH near the falx. rCTH stable. Increase Keppra to 1250mg BID; continue Dilantin 400mg ER daily     PRESENT CONCERNS:    He presents with his sister today. He had seizure vs syncope on 1/25/24 while at work. Video reviewed from the factory. Patient was walking forwards carrying a box- set the box down immediately took a few rapid steps backwards and then dropped to the ground- this is where video ends. He does not remember anything around the event. Co-workers said he was unconscious for seconds and immediately back to baseline. No post-ictal symptoms.     He has had intermittent dizziness- this is chronic.   No new side effects from increased keppra to 1250mg bid   Has had chronic drowsiness       Review of Systems  All other systems reviewed and negative unless otherwise stated above    CONTROLLED SUBSTANCE  N/A      RESULTS:  EEG:  EEG    Result Date: 1/26/2024  IMPRESSION Impression This routine EEG is normal. No epileptiform discharges or lateralizing signs are seen. This  report has been interpreted and electronically signed by     EKG:  Encounter Date: 01/25/24   ECG 12 lead   Result Value    Ventricular Rate 84    Atrial Rate 84    SD Interval 164    QRS Duration 102    QT Interval 404    QTC Calculation(Bazett) 477    P Axis 20    R Axis -11    T Axis 39    QRS Count 13    Q Onset 210    P Onset 128    P Offset 177    T Offset 412    QTC Fredericia 451    Narrative    Normal sinus rhythm  Normal ECG  No previous ECGs available       LABS:      IMAGING:  No MRI head results found for the past 12 months    CT head wo IV contrast    Result Date: 1/26/2024  Interpreted By:  Lyndon Armstrong, STUDY: CT HEAD WO IV CONTRAST;  1/25/2024 4:46 pm   INDICATION: Signs/Symptoms:repeat CT H for SDH and SAH.   COMPARISON: CT head dated 01/24/2024.   ACCESSION NUMBER(S): [id number]   ORDERING CLINICIAN: ALVARADO MASON   TECHNIQUE: Noncontrast axial CT scan of head was performed.   FINDINGS: Parenchyma: There is a small amount of presumably subarachnoid hemorrhage within the anterior interhemispheric region and abutting the anterior aspect of the falx with trace subdural component not excluded. No new or enlarging intracranial hemorrhage is evident. No intracranial mass effect or herniation. Gray-white matter differentiation is maintained with no CT apparent acute infarct. Mild chronic small vessel ischemic disease suspected.   CSF Spaces: The ventricles, sulci and basal cisterns are within normal limits for age.   Extra-Axial Fluid: There is no extraaxial fluid collection.   Calvarium: No displaced calvarial fracture.   Paranasal sinuses: Mild mucosal thickening of the bilateral maxillary paranasal sinuses. Visualized paranasal sinuses are otherwise clear.   Mastoids: Clear.   Orbits: Normal.   Soft tissues: Similar appearance of moderate to large scalp vertex hematoma/laceration status post stapling.       Unchanged extent/appearance of small volume subarachnoid and possibly  subdural hemorrhage within the anterior interhemispheric region. No new or enlarging intracranial hemorrhage. No associated mass effect or herniation.   Similar appearance of large scalp vertex hematoma/laceration status post stapling. No displaced calvarial fracture is evident.   MACRO: None   Signed by: Lyndon Armstrong 1/26/2024 8:08 AM Dictation workstation:   [id number]    CT head wo IV contrast    Result Date: 1/25/2024  Interpreted By:  Catrachito Evans, STUDY: CT HEAD WO IV CONTRAST;  1/25/2024 10:38 am   INDICATION: Signs/Symptoms:head injury.   COMPARISON: None.   ACCESSION NUMBER(S): [id number]   ORDERING CLINICIAN: MIRACLE THOMPSON   TECHNIQUE: Noncontrast axial CT scan of head was performed. Angled reformats in brain and bone windows were generated. The images were reviewed in bone, brain, blood and soft tissue windows.   FINDINGS: CSF Spaces: The ventricles, sulci and basal cisterns are within normal limits. Tiny subdural hematoma along the anterior and posterior falx are noted. These are approximately 4 mm in thickness. Also tiny likely subarachnoid hemorrhage along the right para mesencephalic cistern.   Parenchyma:  The grey-white differentiation is intact. There is no mass effect or midline shift.  There is no intracranial hemorrhage.   Calvarium: The calvarium is unremarkable. Large posterosuperior scalp hematoma. Paranasal sinuses and mastoids: Visualized paranasal sinuses and mastoids are clear. Nasal septal deviation left.       Small subdural acute hemorrhage along the anterior and posterior falx. Also tiny likely subarachnoid hemorrhage along the right para mesencephalic cistern. No midline shift or mass effect.   Large posterior scalp hematoma.   MACRO: Catrachito Evans discussed the significance and urgency of this critical finding by secure chat with  Dr. Naila Riddle on 1/25/2024 at 10:43 am.  (**-RCF-**) Findings:  See findings.     Signed by: Catrachito Evans 1/25/2024 10:48 AM Dictation  workstation:   [id number]     Results for orders placed or performed during the hospital encounter of 01/25/24   CT head wo IV contrast    Narrative    Interpreted By:  Lyndon Armstrong,   STUDY:  CT HEAD WO IV CONTRAST;  1/25/2024 4:46 pm      INDICATION:  Signs/Symptoms:repeat CT H for SDH and SAH.      COMPARISON:  CT head dated 01/24/2024.      ACCESSION NUMBER(S):  [id number]      ORDERING CLINICIAN:  ALVARADO MASON      TECHNIQUE:  Noncontrast axial CT scan of head was performed.      FINDINGS:  Parenchyma: There is a small amount of presumably subarachnoid  hemorrhage within the anterior interhemispheric region and abutting  the anterior aspect of the falx with trace subdural component not  excluded. No new or enlarging intracranial hemorrhage is evident. No  intracranial mass effect or herniation. Gray-white matter  differentiation is maintained with no CT apparent acute infarct. Mild  chronic small vessel ischemic disease suspected.      CSF Spaces: The ventricles, sulci and basal cisterns are within  normal limits for age.      Extra-Axial Fluid: There is no extraaxial fluid collection.      Calvarium: No displaced calvarial fracture.      Paranasal sinuses: Mild mucosal thickening of the bilateral maxillary  paranasal sinuses. Visualized paranasal sinuses are otherwise clear.      Mastoids: Clear.      Orbits: Normal.      Soft tissues: Similar appearance of moderate to large scalp vertex  hematoma/laceration status post stapling.        Impression    Unchanged extent/appearance of small volume subarachnoid and possibly  subdural hemorrhage within the anterior interhemispheric region. No  new or enlarging intracranial hemorrhage. No associated mass effect  or herniation.      Similar appearance of large scalp vertex hematoma/laceration status  post stapling. No displaced calvarial fracture is evident.      MACRO:  None      Signed by: Lyndon Armstrong 1/26/2024 8:08 AM  Dictation workstation:    [id number]   EEG    Impression    IMPRESSION    Impression    This routine EEG is normal. No epileptiform discharges or lateralizing signs are seen.      This report has been interpreted and electronically signed by       Vitals:  Vitals:    02/13/24 1203   BP: 147/86   Pulse: 84   Resp: 18       PHYSICAL EXAM:  Neurologic Exam       ASSESSMENT & PLAN:   58 y.o. male presenting in follow-up for peviously diagnosed epilepsy. Semiology as described above    Problem List Items Addressed This Visit    None    Type: EPE  Semiology: Unspecified aura->Dialeptic->Automotor->Right hand clonic sz  Lateralizing signs: Post ictal aphasia  Freq: 1-2 per year (2 in the last 6 months)  EZ: Left temporal  Etiology: TBI  RMC: Chronic daily headache, SPENSER  Past AEDs: PHB  Current AEDs: PHT ER 400mg qd and LEV 1250mg bid     1/24/24 rEEG: normal     Based on video, patient and co-workers recollection event appears to be syncopal.   -will leave keppra at 1250mg bid   -given dizziness and drowsiness with supratherapueitc PHT will decrease from 400 to 350mg daily   -labs in 1 month   -may return to work with restrictions  -no driving June 2024   -RTC in June

## 2024-05-03 ENCOUNTER — LAB (OUTPATIENT)
Dept: LAB | Facility: LAB | Age: 59
End: 2024-05-03
Payer: COMMERCIAL

## 2024-05-03 DIAGNOSIS — E78.2 MIXED HYPERLIPIDEMIA: ICD-10-CM

## 2024-05-03 DIAGNOSIS — J30.9 ALLERGIC RHINITIS, UNSPECIFIED SEASONALITY, UNSPECIFIED TRIGGER: ICD-10-CM

## 2024-05-03 DIAGNOSIS — R35.1 NOCTURIA: ICD-10-CM

## 2024-05-03 DIAGNOSIS — G40.109 PARTIAL SYMPTOMATIC EPILEPSY WITH SIMPLE PARTIAL SEIZURES, NOT INTRACTABLE, WITHOUT STATUS EPILEPTICUS (MULTI): ICD-10-CM

## 2024-05-03 DIAGNOSIS — R03.0 ELEVATED BLOOD PRESSURE READING: ICD-10-CM

## 2024-05-03 DIAGNOSIS — E66.09 CLASS 1 OBESITY DUE TO EXCESS CALORIES WITH SERIOUS COMORBIDITY AND BODY MASS INDEX (BMI) OF 32.0 TO 32.9 IN ADULT: ICD-10-CM

## 2024-05-03 LAB
ALBUMIN SERPL BCP-MCNC: 4.2 G/DL (ref 3.4–5)
ALP SERPL-CCNC: 65 U/L (ref 33–120)
ALT SERPL W P-5'-P-CCNC: 24 U/L (ref 10–52)
ANION GAP SERPL CALC-SCNC: 11 MMOL/L (ref 10–20)
AST SERPL W P-5'-P-CCNC: 18 U/L (ref 9–39)
BASOPHILS # BLD AUTO: 0.03 X10*3/UL (ref 0–0.1)
BASOPHILS NFR BLD AUTO: 0.6 %
BILIRUB SERPL-MCNC: 0.4 MG/DL (ref 0–1.2)
BUN SERPL-MCNC: 15 MG/DL (ref 6–23)
CALCIUM SERPL-MCNC: 9 MG/DL (ref 8.6–10.3)
CHLORIDE SERPL-SCNC: 106 MMOL/L (ref 98–107)
CHOLEST SERPL-MCNC: 205 MG/DL (ref 0–199)
CHOLESTEROL/HDL RATIO: 3.9
CO2 SERPL-SCNC: 28 MMOL/L (ref 21–32)
CREAT SERPL-MCNC: 0.79 MG/DL (ref 0.5–1.3)
EGFRCR SERPLBLD CKD-EPI 2021: >90 ML/MIN/1.73M*2
EOSINOPHIL # BLD AUTO: 0.24 X10*3/UL (ref 0–0.7)
EOSINOPHIL NFR BLD AUTO: 4.7 %
ERYTHROCYTE [DISTWIDTH] IN BLOOD BY AUTOMATED COUNT: 12.5 % (ref 11.5–14.5)
EST. AVERAGE GLUCOSE BLD GHB EST-MCNC: 295 MG/DL
GLUCOSE SERPL-MCNC: 96 MG/DL (ref 74–99)
HBA1C MFR BLD: 11.9 %
HCT VFR BLD AUTO: 40.9 % (ref 41–52)
HDLC SERPL-MCNC: 52.2 MG/DL
HGB BLD-MCNC: 13.6 G/DL (ref 13.5–17.5)
IMM GRANULOCYTES # BLD AUTO: 0.01 X10*3/UL (ref 0–0.7)
IMM GRANULOCYTES NFR BLD AUTO: 0.2 % (ref 0–0.9)
LDLC SERPL CALC-MCNC: 132 MG/DL
LEVETIRACETAM SERPL-MCNC: 31 UG/ML (ref 10–40)
LYMPHOCYTES # BLD AUTO: 1.72 X10*3/UL (ref 1.2–4.8)
LYMPHOCYTES NFR BLD AUTO: 33.9 %
MCH RBC QN AUTO: 30 PG (ref 26–34)
MCHC RBC AUTO-ENTMCNC: 33.3 G/DL (ref 32–36)
MCV RBC AUTO: 90 FL (ref 80–100)
MONOCYTES # BLD AUTO: 0.44 X10*3/UL (ref 0.1–1)
MONOCYTES NFR BLD AUTO: 8.7 %
NEUTROPHILS # BLD AUTO: 2.63 X10*3/UL (ref 1.2–7.7)
NEUTROPHILS NFR BLD AUTO: 51.9 %
NON HDL CHOLESTEROL: 153 MG/DL (ref 0–149)
NRBC BLD-RTO: 0 /100 WBCS (ref 0–0)
PHENYTOIN SERPL-MCNC: 19.3 UG/ML (ref 10–20)
PLATELET # BLD AUTO: 217 X10*3/UL (ref 150–450)
POTASSIUM SERPL-SCNC: 4.5 MMOL/L (ref 3.5–5.3)
PROT SERPL-MCNC: 6.8 G/DL (ref 6.4–8.2)
PSA SERPL-MCNC: 0.35 NG/ML
RBC # BLD AUTO: 4.54 X10*6/UL (ref 4.5–5.9)
SODIUM SERPL-SCNC: 140 MMOL/L (ref 136–145)
TRIGL SERPL-MCNC: 103 MG/DL (ref 0–149)
TSH SERPL-ACNC: 1.66 MIU/L (ref 0.44–3.98)
VLDL: 21 MG/DL (ref 0–40)
WBC # BLD AUTO: 5.1 X10*3/UL (ref 4.4–11.3)

## 2024-05-03 PROCEDURE — 36415 COLL VENOUS BLD VENIPUNCTURE: CPT

## 2024-05-03 PROCEDURE — 84443 ASSAY THYROID STIM HORMONE: CPT

## 2024-05-03 PROCEDURE — 80061 LIPID PANEL: CPT

## 2024-05-03 PROCEDURE — 85025 COMPLETE CBC W/AUTO DIFF WBC: CPT

## 2024-05-03 PROCEDURE — 84153 ASSAY OF PSA TOTAL: CPT

## 2024-05-03 PROCEDURE — 83036 HEMOGLOBIN GLYCOSYLATED A1C: CPT

## 2024-05-03 PROCEDURE — 80053 COMPREHEN METABOLIC PANEL: CPT

## 2024-05-03 PROCEDURE — 80185 ASSAY OF PHENYTOIN TOTAL: CPT

## 2024-05-03 PROCEDURE — 80177 DRUG SCRN QUAN LEVETIRACETAM: CPT

## 2024-06-03 ENCOUNTER — TELEPHONE (OUTPATIENT)
Dept: PRIMARY CARE | Facility: CLINIC | Age: 59
End: 2024-06-03
Payer: COMMERCIAL

## 2024-06-03 DIAGNOSIS — E11.9 NEW ONSET TYPE 2 DIABETES MELLITUS (MULTI): Primary | ICD-10-CM

## 2024-06-03 NOTE — TELEPHONE ENCOUNTER
----- Message from Tricia Bueno MD sent at 6/2/2024  8:39 PM EDT -----  Chas, thank you for getting your blood work.  Seizure medications are in the therapeutic range, however your sugar or test for diabetes is very high.  It indicates we need to add medicine for diabetes to your medication list, and see you back within 3 months for recheck.  What do you know about diabetes?  Have you had problems with this before?  I would like to send 2 medications to the pharmacy for you and we will recheck in 3 months when we see you back.  Would that be okay?

## 2024-06-04 RX ORDER — DEXTROSE 4 G
TABLET,CHEWABLE ORAL
Qty: 1 EACH | Refills: 0 | Status: SHIPPED | OUTPATIENT
Start: 2024-06-04

## 2024-06-04 RX ORDER — LANCETS
EACH MISCELLANEOUS
Qty: 100 EACH | Refills: 3 | Status: SHIPPED | OUTPATIENT
Start: 2024-06-04

## 2024-06-04 RX ORDER — IBUPROFEN 200 MG
1 CAPSULE ORAL DAILY
Qty: 100 STRIP | Refills: 3 | Status: SHIPPED | OUTPATIENT
Start: 2024-06-04

## 2024-06-04 RX ORDER — METFORMIN HYDROCHLORIDE 500 MG/1
500 TABLET ORAL
Qty: 200 TABLET | Refills: 3 | Status: SHIPPED | OUTPATIENT
Start: 2024-06-04 | End: 2025-07-09

## 2024-06-13 DIAGNOSIS — E11.9 NEW ONSET TYPE 2 DIABETES MELLITUS (MULTI): ICD-10-CM

## 2024-06-13 RX ORDER — DEXTROSE 4 G
TABLET,CHEWABLE ORAL
Qty: 1 EACH | Refills: 0 | Status: SHIPPED | OUTPATIENT
Start: 2024-06-13

## 2024-06-13 RX ORDER — LANCETS
EACH MISCELLANEOUS
Qty: 100 EACH | Refills: 0 | Status: SHIPPED | OUTPATIENT
Start: 2024-06-13

## 2024-06-13 NOTE — TELEPHONE ENCOUNTER
His sister Eileen called he need s the meter and lancet machine please send to [facility] in [place]

## 2024-06-24 ENCOUNTER — OFFICE VISIT (OUTPATIENT)
Dept: NEUROLOGY | Facility: CLINIC | Age: 59
End: 2024-06-24
Payer: COMMERCIAL

## 2024-06-24 VITALS
DIASTOLIC BLOOD PRESSURE: 77 MMHG | SYSTOLIC BLOOD PRESSURE: 124 MMHG | WEIGHT: 193 LBS | BODY MASS INDEX: 32.12 KG/M2 | RESPIRATION RATE: 18 BRPM | HEART RATE: 89 BPM

## 2024-06-24 DIAGNOSIS — G40.109 PARTIAL SYMPTOMATIC EPILEPSY WITH SIMPLE PARTIAL SEIZURES, NOT INTRACTABLE, WITHOUT STATUS EPILEPTICUS (MULTI): ICD-10-CM

## 2024-06-24 PROCEDURE — 99214 OFFICE O/P EST MOD 30 MIN: CPT | Performed by: NURSE PRACTITIONER

## 2024-06-24 PROCEDURE — 1036F TOBACCO NON-USER: CPT | Performed by: NURSE PRACTITIONER

## 2024-06-24 PROCEDURE — 3008F BODY MASS INDEX DOCD: CPT | Performed by: NURSE PRACTITIONER

## 2024-06-24 RX ORDER — PHENYTOIN 50 MG/1
50 TABLET, CHEWABLE ORAL DAILY
Qty: 90 TABLET | Refills: 3 | Status: SHIPPED | OUTPATIENT
Start: 2024-06-24 | End: 2025-06-24

## 2024-06-24 ASSESSMENT — ENCOUNTER SYMPTOMS: SEIZURES: 1

## 2024-06-24 ASSESSMENT — PAIN SCALES - GENERAL: PAINLEVEL: 0-NO PAIN

## 2024-06-24 NOTE — LETTER
June 24, 2024     Patient: Chas Godinez   YOB: 1965   Date of Visit: 6/24/2024       To Whom It May Concern:    It is my medical opinion that Chas Godinez may return to full duty immediately with no restrictions. His neurological condition is stable at this time.    If you have any questions or concerns, please don't hesitate to call.       Sincerely,    Mary De Los Santos, ABHILASH-CNP    CC: No Recipients

## 2024-06-24 NOTE — PROGRESS NOTES
Patient ID: Chas Godinez 59 y.o.male presenting in follow-up for epilepsy.     Seizures       Type: EPE  Semiology: Unspecified aura->Dialeptic->Automotor->Right hand clonic sz  Lateralizing signs: Post ictal aphasia  Freq: 1-2 per year (2 in the last 6 months)  EZ: Left temporal  Etiology: TBI  RMC: Chronic daily headache, SPENSER  Past AEDs: PHB  Current AEDs: PHT ER 350mg qd and LEV 1250mg bid      -last seizure Dec 2022 in the setting of missing medication     ED Course 1/26/2024:  presented to the [facility] ED on 1/25/24 with concerns of an episode of passing out with resultant head trauma. Neurology consulted for concerns of seizure.  He was lifting something off of the conveyor built, when the next thing he knew, he woke up on the ground with people gathered around him. He denied any prodrome. No reported clonic activity. No tongue biting, urinary/bowel incontinence.  CTH demonstrated a small area of paramesencephalic hemorrhage (SAH) and two small SDH near the falx. rCTH stable. Increase Keppra to 1250mg BID; continue Dilantin 400mg ER daily     02/2024:   He presents with his sister today. He had seizure vs syncope on 1/25/24 while at work. Video reviewed from the factory. Patient was walking forwards carrying a box- set the box down immediately took a few rapid steps backwards and then dropped to the ground- this is where video ends. He does not remember anything around the event. Co-workers said he was unconscious for seconds and immediately back to baseline. No post-ictal symptoms.     No new side effects from increased keppra to 1250mg bid   Has had chronic drowsiness     PRESENT CONCERNS:    Recently diagnosed with diabetes . No seizures has been doing well. Dizziness is better and levels of both LEV and PHT are therapeutic.       Review of Systems   Neurological:  Positive for seizures.     All other systems reviewed and negative unless otherwise stated above    CONTROLLED  SUBSTANCE  N/A      RESULTS:  EEG:  EEG    Result Date: 1/26/2024  IMPRESSION Impression This routine EEG is normal. No epileptiform discharges or lateralizing signs are seen. This report has been interpreted and electronically signed by     EKG:  Encounter Date: 01/25/24   ECG 12 lead   Result Value    Ventricular Rate 84    Atrial Rate 84    ID Interval 164    QRS Duration 102    QT Interval 404    QTC Calculation(Bazett) 477    P Axis 20    R Axis -11    T Axis 39    QRS Count 13    Q Onset 210    P Onset 128    P Offset 177    T Offset 412    QTC Fredericia 451    Narrative    Normal sinus rhythm  Normal ECG  No previous ECGs available       LABS:      IMAGING:  No MRI head results found for the past 12 months    CT head wo IV contrast    Result Date: 1/26/2024  Interpreted By:  Lyndon Armstrong, STUDY: CT HEAD WO IV CONTRAST;  1/25/2024 4:46 pm   INDICATION: Signs/Symptoms:repeat CT H for SDH and SAH.   COMPARISON: CT head dated 01/24/2024.   ACCESSION NUMBER(S): [id number]   ORDERING CLINICIAN: ALVARADO MASON   TECHNIQUE: Noncontrast axial CT scan of head was performed.   FINDINGS: Parenchyma: There is a small amount of presumably subarachnoid hemorrhage within the anterior interhemispheric region and abutting the anterior aspect of the falx with trace subdural component not excluded. No new or enlarging intracranial hemorrhage is evident. No intracranial mass effect or herniation. Gray-white matter differentiation is maintained with no CT apparent acute infarct. Mild chronic small vessel ischemic disease suspected.   CSF Spaces: The ventricles, sulci and basal cisterns are within normal limits for age.   Extra-Axial Fluid: There is no extraaxial fluid collection.   Calvarium: No displaced calvarial fracture.   Paranasal sinuses: Mild mucosal thickening of the bilateral maxillary paranasal sinuses. Visualized paranasal sinuses are otherwise clear.   Mastoids: Clear.   Orbits: Normal.   Soft  tissues: Similar appearance of moderate to large scalp vertex hematoma/laceration status post stapling.       Unchanged extent/appearance of small volume subarachnoid and possibly subdural hemorrhage within the anterior interhemispheric region. No new or enlarging intracranial hemorrhage. No associated mass effect or herniation.   Similar appearance of large scalp vertex hematoma/laceration status post stapling. No displaced calvarial fracture is evident.   MACRO: None   Signed by: Lyndon Armstrong 1/26/2024 8:08 AM Dictation workstation:   [id number]    CT head wo IV contrast    Result Date: 1/25/2024  Interpreted By:  Catrachito Evans, STUDY: CT HEAD WO IV CONTRAST;  1/25/2024 10:38 am   INDICATION: Signs/Symptoms:head injury.   COMPARISON: None.   ACCESSION NUMBER(S): [id number]   ORDERING CLINICIAN: MIRACLE THOMPSON   TECHNIQUE: Noncontrast axial CT scan of head was performed. Angled reformats in brain and bone windows were generated. The images were reviewed in bone, brain, blood and soft tissue windows.   FINDINGS: CSF Spaces: The ventricles, sulci and basal cisterns are within normal limits. Tiny subdural hematoma along the anterior and posterior falx are noted. These are approximately 4 mm in thickness. Also tiny likely subarachnoid hemorrhage along the right para mesencephalic cistern.   Parenchyma:  The grey-white differentiation is intact. There is no mass effect or midline shift.  There is no intracranial hemorrhage.   Calvarium: The calvarium is unremarkable. Large posterosuperior scalp hematoma. Paranasal sinuses and mastoids: Visualized paranasal sinuses and mastoids are clear. Nasal septal deviation left.       Small subdural acute hemorrhage along the anterior and posterior falx. Also tiny likely subarachnoid hemorrhage along the right para mesencephalic cistern. No midline shift or mass effect.   Large posterior scalp hematoma.   MACRO: Catrachito Evans discussed the significance and urgency of this  critical finding by secure chat with  Dr. Naila Riddle on 1/25/2024 at 10:43 am.  (**-RCF-**) Findings:  See findings.     Signed by: Catrachito Evans 1/25/2024 10:48 AM Dictation workstation:   [id number]     Results for orders placed or performed during the hospital encounter of 01/25/24   CT head wo IV contrast    Narrative    Interpreted By:  Lyndon Armstrong,   STUDY:  CT HEAD WO IV CONTRAST;  1/25/2024 4:46 pm      INDICATION:  Signs/Symptoms:repeat CT H for SDH and SAH.      COMPARISON:  CT head dated 01/24/2024.      ACCESSION NUMBER(S):  [id number]      ORDERING CLINICIAN:  ALVARADO MASON      TECHNIQUE:  Noncontrast axial CT scan of head was performed.      FINDINGS:  Parenchyma: There is a small amount of presumably subarachnoid  hemorrhage within the anterior interhemispheric region and abutting  the anterior aspect of the falx with trace subdural component not  excluded. No new or enlarging intracranial hemorrhage is evident. No  intracranial mass effect or herniation. Gray-white matter  differentiation is maintained with no CT apparent acute infarct. Mild  chronic small vessel ischemic disease suspected.      CSF Spaces: The ventricles, sulci and basal cisterns are within  normal limits for age.      Extra-Axial Fluid: There is no extraaxial fluid collection.      Calvarium: No displaced calvarial fracture.      Paranasal sinuses: Mild mucosal thickening of the bilateral maxillary  paranasal sinuses. Visualized paranasal sinuses are otherwise clear.      Mastoids: Clear.      Orbits: Normal.      Soft tissues: Similar appearance of moderate to large scalp vertex  hematoma/laceration status post stapling.        Impression    Unchanged extent/appearance of small volume subarachnoid and possibly  subdural hemorrhage within the anterior interhemispheric region. No  new or enlarging intracranial hemorrhage. No associated mass effect  or herniation.      Similar appearance of large scalp  vertex hematoma/laceration status  post stapling. No displaced calvarial fracture is evident.      MACRO:  None      Signed by: Lyndon Armstrong 1/26/2024 8:08 AM  Dictation workstation:   [id number]   EEG    Impression    IMPRESSION    Impression    This routine EEG is normal. No epileptiform discharges or lateralizing signs are seen.      This report has been interpreted and electronically signed by       Vitals:  Vitals:    06/24/24 1518   BP: 124/77   Pulse: 89   Resp: 18           ASSESSMENT & PLAN:   59 y.o. male presenting in follow-up for peviously diagnosed epilepsy. Semiology as described above    Problem List Items Addressed This Visit       Epilepsy without status epilepticus, not intractable (Multi)    Relevant Medications    phenytoin (Dilantin Infatabs) 50 mg chewable tablet     Type: EPE  Semiology: Unspecified aura->Dialeptic->Automotor->Right hand clonic sz  Lateralizing signs: Post ictal aphasia  Freq: 1-2 per year (2 in the last 6 months)  EZ: Left temporal  Etiology: TBI  RMC: Chronic daily headache, SPENSER  Past AEDs: PHB  Current AEDs: PHT ER 350mg qd and LEV 1250mg bid     1/24/24 rEEG: normal     -continue keppra at 1250mg bid   -PHT continue 350mg daily   -may return to work without restrictions  -RTC in June 2025

## 2024-08-05 ENCOUNTER — APPOINTMENT (OUTPATIENT)
Dept: PRIMARY CARE | Facility: CLINIC | Age: 59
End: 2024-08-05
Payer: COMMERCIAL

## 2024-11-06 ENCOUNTER — APPOINTMENT (OUTPATIENT)
Dept: PRIMARY CARE | Facility: CLINIC | Age: 59
End: 2024-11-06
Payer: COMMERCIAL

## 2024-11-06 VITALS
SYSTOLIC BLOOD PRESSURE: 126 MMHG | DIASTOLIC BLOOD PRESSURE: 78 MMHG | RESPIRATION RATE: 16 BRPM | BODY MASS INDEX: 30.16 KG/M2 | WEIGHT: 181 LBS | HEART RATE: 84 BPM | OXYGEN SATURATION: 98 % | HEIGHT: 65 IN

## 2024-11-06 DIAGNOSIS — E11.9 NEW ONSET TYPE 2 DIABETES MELLITUS (MULTI): ICD-10-CM

## 2024-11-06 DIAGNOSIS — M25.522 LEFT ELBOW PAIN: Primary | ICD-10-CM

## 2024-11-06 DIAGNOSIS — R35.1 NOCTURIA: ICD-10-CM

## 2024-11-06 DIAGNOSIS — E78.2 MIXED HYPERLIPIDEMIA: ICD-10-CM

## 2024-11-06 DIAGNOSIS — R56.9 SEIZURE (MULTI): ICD-10-CM

## 2024-11-06 PROBLEM — G89.29 CHRONIC HEADACHE: Status: ACTIVE | Noted: 2024-11-06

## 2024-11-06 PROBLEM — R51.9 FRONTAL HEADACHE: Status: ACTIVE | Noted: 2024-11-06

## 2024-11-06 PROBLEM — L84 CORN: Status: ACTIVE | Noted: 2023-05-18

## 2024-11-06 PROBLEM — M19.90 ARTHRITIS: Status: ACTIVE | Noted: 2024-11-06

## 2024-11-06 PROBLEM — R03.0 ELEVATED BLOOD PRESSURE READING: Status: ACTIVE | Noted: 2024-11-06

## 2024-11-06 PROBLEM — G47.39 MIXED SLEEP APNEA: Status: ACTIVE | Noted: 2022-09-23

## 2024-11-06 PROBLEM — M25.529 ARTHRALGIA OF ELBOW: Status: ACTIVE | Noted: 2023-05-18

## 2024-11-06 PROBLEM — R51.9 CHRONIC HEADACHE: Status: ACTIVE | Noted: 2024-11-06

## 2024-11-06 PROBLEM — E78.5 HYPERLIPIDEMIA: Status: ACTIVE | Noted: 2023-05-18

## 2024-11-06 PROBLEM — R19.5 POSITIVE COLORECTAL CANCER SCREENING USING DNA-BASED STOOL TEST: Status: ACTIVE | Noted: 2023-05-18

## 2024-11-06 PROBLEM — K21.9 ESOPHAGEAL REFLUX: Status: ACTIVE | Noted: 2023-07-19

## 2024-11-06 LAB — POC HEMOGLOBIN A1C: 5.7 % (ref 4.2–6.5)

## 2024-11-06 PROCEDURE — 3074F SYST BP LT 130 MM HG: CPT | Performed by: FAMILY MEDICINE

## 2024-11-06 PROCEDURE — 3050F LDL-C >= 130 MG/DL: CPT | Performed by: FAMILY MEDICINE

## 2024-11-06 PROCEDURE — 99214 OFFICE O/P EST MOD 30 MIN: CPT | Performed by: FAMILY MEDICINE

## 2024-11-06 PROCEDURE — 3008F BODY MASS INDEX DOCD: CPT | Performed by: FAMILY MEDICINE

## 2024-11-06 PROCEDURE — 3046F HEMOGLOBIN A1C LEVEL >9.0%: CPT | Performed by: FAMILY MEDICINE

## 2024-11-06 PROCEDURE — 1036F TOBACCO NON-USER: CPT | Performed by: FAMILY MEDICINE

## 2024-11-06 PROCEDURE — 83036 HEMOGLOBIN GLYCOSYLATED A1C: CPT | Performed by: FAMILY MEDICINE

## 2024-11-06 PROCEDURE — 3078F DIAST BP <80 MM HG: CPT | Performed by: FAMILY MEDICINE

## 2024-11-06 RX ORDER — METFORMIN HYDROCHLORIDE 500 MG/1
500 TABLET ORAL
Qty: 180 TABLET | Refills: 3 | Status: SHIPPED | OUTPATIENT
Start: 2024-11-06 | End: 2025-11-06

## 2024-11-06 RX ORDER — MELOXICAM 7.5 MG/1
7.5 TABLET ORAL DAILY
COMMUNITY
End: 2024-11-06 | Stop reason: SDUPTHER

## 2024-11-06 RX ORDER — MELOXICAM 7.5 MG/1
7.5 TABLET ORAL DAILY
Qty: 90 TABLET | Refills: 3 | Status: SHIPPED | OUTPATIENT
Start: 2024-11-06 | End: 2025-11-06

## 2024-11-06 ASSESSMENT — PATIENT HEALTH QUESTIONNAIRE - PHQ9
1. LITTLE INTEREST OR PLEASURE IN DOING THINGS: NOT AT ALL
2. FEELING DOWN, DEPRESSED OR HOPELESS: NOT AT ALL
SUM OF ALL RESPONSES TO PHQ9 QUESTIONS 1 AND 2: 0

## 2024-11-06 ASSESSMENT — ANXIETY QUESTIONNAIRES
5. BEING SO RESTLESS THAT IT IS HARD TO SIT STILL: NOT AT ALL
3. WORRYING TOO MUCH ABOUT DIFFERENT THINGS: NOT AT ALL
2. NOT BEING ABLE TO STOP OR CONTROL WORRYING: NOT AT ALL
6. BECOMING EASILY ANNOYED OR IRRITABLE: NOT AT ALL
1. FEELING NERVOUS, ANXIOUS, OR ON EDGE: NOT AT ALL
7. FEELING AFRAID AS IF SOMETHING AWFUL MIGHT HAPPEN: NOT AT ALL
IF YOU CHECKED OFF ANY PROBLEMS ON THIS QUESTIONNAIRE, HOW DIFFICULT HAVE THESE PROBLEMS MADE IT FOR YOU TO DO YOUR WORK, TAKE CARE OF THINGS AT HOME, OR GET ALONG WITH OTHER PEOPLE: NOT DIFFICULT AT ALL
GAD7 TOTAL SCORE: 0
4. TROUBLE RELAXING: NOT AT ALL

## 2024-11-06 ASSESSMENT — COLUMBIA-SUICIDE SEVERITY RATING SCALE - C-SSRS
2. HAVE YOU ACTUALLY HAD ANY THOUGHTS OF KILLING YOURSELF?: NO
1. IN THE PAST MONTH, HAVE YOU WISHED YOU WERE DEAD OR WISHED YOU COULD GO TO SLEEP AND NOT WAKE UP?: NO
6. HAVE YOU EVER DONE ANYTHING, STARTED TO DO ANYTHING, OR PREPARED TO DO ANYTHING TO END YOUR LIFE?: NO

## 2024-11-06 ASSESSMENT — ENCOUNTER SYMPTOMS
DEPRESSION: 0
ARTHRALGIAS: 1
OCCASIONAL FEELINGS OF UNSTEADINESS: 0
LOSS OF SENSATION IN FEET: 0

## 2024-11-06 NOTE — PROGRESS NOTES
Subjective   Patient ID: Chas Godinez is a 59 y.o. male.    HPI  59 year old male for follow up on diabetes, seizures and overrall health. Chas is doing well, having left arm pain for about a week. Since last regular visit had fall with questionable seizure-like activity.  Ended up having a subdural hematoma, and stitches in his head.  He did see neurology, and have an EEG.  Medications were adjusted, with continuing Dilantin and adding Keppra.  On his last visit his A1c was 11.9, today it was done and it was 5.8%.  He admits to have giving up fast food, otherwise continues to work  Review of Systems   Musculoskeletal:  Positive for arthralgias.        Arthralgias,left arm pain for one week over left bicep elbow and forearm,    All other systems reviewed and are negative.      Objective   Physical Exam  Vitals reviewed.   Constitutional:       Appearance: Normal appearance.   HENT:      Head: Normocephalic and atraumatic.      Right Ear: Tympanic membrane normal.      Left Ear: Tympanic membrane normal.      Nose: Nose normal.      Mouth/Throat:      Mouth: Mucous membranes are moist.      Pharynx: Oropharynx is clear.   Eyes:      Extraocular Movements: Extraocular movements intact.      Conjunctiva/sclera: Conjunctivae normal.      Pupils: Pupils are equal, round, and reactive to light.   Cardiovascular:      Rate and Rhythm: Normal rate and regular rhythm.      Pulses: Normal pulses.      Heart sounds: Normal heart sounds.   Pulmonary:      Effort: Pulmonary effort is normal.      Breath sounds: Normal breath sounds.   Abdominal:      General: Abdomen is flat. Bowel sounds are normal.      Palpations: Abdomen is soft.   Musculoskeletal:         General: Swelling and tenderness present.      Cervical back: Normal range of motion and neck supple.      Comments: Left arm pain and tenderness with full rom, no weakness, overuse injury   Skin:     General: Skin is warm and dry.      Capillary Refill: Capillary refill  takes less than 2 seconds.   Neurological:      General: No focal deficit present.      Mental Status: He is alert and oriented to person, place, and time. Mental status is at baseline.   Psychiatric:         Mood and Affect: Mood normal.         Behavior: Behavior normal.         Assessment/Plan   Diagnoses and all orders for this visit:  Left elbow pain-seems related to an overuse injury.  Advised banding or bracing.  Physical therapy referral and x-ray if no better in the next week or 2.  Ice and heat and continue to take the anti-inflammatory prescription that he has.  -     meloxicam (Mobic) 7.5 mg tablet; Take 1 tablet (7.5 mg) by mouth once daily.  New onset type 2 diabetes mellitus (Multi)-patient is on Jardiance and metformin.  His A1c has decreased to 5.8% which is outstanding.  Will recheck in 6 months and if stable plan to decrease metformin  -     POCT glycosylated hemoglobin (Hb A1C) manually resulted  -     empagliflozin (Jardiance) 25 mg; Take 1 tablet (25 mg) by mouth once daily.  -     metFORMIN (Glucophage) 500 mg tablet; Take 1 tablet (500 mg) by mouth 2 times daily (morning and late afternoon).  -     Albumin-Creatinine Ratio, Urine Random; Future  -     Hemoglobin A1C; Future  -     CBC and Auto Differential; Future  -     TSH with reflex to Free T4 if abnormal; Future  Seizure (Multi)-stable with neurology.  Dr. De Los Santos.  Taking Keppra and Dilantin  -     Follow Up In [facility] - PCP - Established  Mixed hyperlipidemia-stable and controlled.  Plan to recheck in 6 months  -     Follow Up In [facility] - PCP - Established  -     CBC and Auto Differential; Future  -     Comprehensive Metabolic Panel; Future  -     Lipid Panel; Future  Nocturia  -     Prostate Specific Antigen; Future

## 2025-01-04 DIAGNOSIS — Z99.89 CPAP (CONTINUOUS POSITIVE AIRWAY PRESSURE) DEPENDENCE: ICD-10-CM

## 2025-01-04 DIAGNOSIS — G47.39 COMPLEX SLEEP APNEA SYNDROME: Primary | ICD-10-CM

## 2025-01-23 ENCOUNTER — TELEPHONE (OUTPATIENT)
Dept: PRIMARY CARE | Facility: CLINIC | Age: 60
End: 2025-01-23
Payer: COMMERCIAL

## 2025-01-23 NOTE — TELEPHONE ENCOUNTER
----- Message from Jaymie ABEL sent at 1/14/2025  9:40 AM EST -----  Order faxed over yesterday  ----- Message -----  From: Tricia Bueno MD  Sent: 1/4/2025   2:12 PM EST  To: Jaymie Murcia MA    Patient in needs of CPAP supplies, please print PAP order and fax to medical service company- Thanks. [phone]

## 2025-01-23 NOTE — TELEPHONE ENCOUNTER
Please let patient know when they call back that the forms for their CPAP was sent over to [employer] service company

## 2025-03-09 DIAGNOSIS — G40.109 PARTIAL SYMPTOMATIC EPILEPSY WITH SIMPLE PARTIAL SEIZURES, NOT INTRACTABLE, WITHOUT STATUS EPILEPTICUS (MULTI): ICD-10-CM

## 2025-03-10 RX ORDER — PHENYTOIN SODIUM 100 MG/1
300 CAPSULE, EXTENDED RELEASE ORAL DAILY
Qty: 270 CAPSULE | Refills: 3 | Status: SHIPPED | OUTPATIENT
Start: 2025-03-10

## 2025-03-10 RX ORDER — LEVETIRACETAM 500 MG/1
TABLET ORAL
Qty: 360 TABLET | Refills: 3 | Status: SHIPPED | OUTPATIENT
Start: 2025-03-10

## 2025-05-14 ENCOUNTER — APPOINTMENT (OUTPATIENT)
Dept: PRIMARY CARE | Facility: CLINIC | Age: 60
End: 2025-05-14
Payer: COMMERCIAL

## 2025-06-19 DIAGNOSIS — G40.109 PARTIAL SYMPTOMATIC EPILEPSY WITH SIMPLE PARTIAL SEIZURES, NOT INTRACTABLE, WITHOUT STATUS EPILEPTICUS: ICD-10-CM

## 2025-06-19 RX ORDER — PHENYTOIN 50 MG/1
50 TABLET, CHEWABLE ORAL DAILY
Qty: 90 TABLET | Refills: 3 | Status: SHIPPED | OUTPATIENT
Start: 2025-06-19

## 2025-07-31 ENCOUNTER — OFFICE VISIT (OUTPATIENT)
Dept: URGENT CARE | Age: 60
End: 2025-07-31
Payer: COMMERCIAL

## 2025-07-31 VITALS
SYSTOLIC BLOOD PRESSURE: 170 MMHG | TEMPERATURE: 98.4 F | HEART RATE: 74 BPM | OXYGEN SATURATION: 95 % | RESPIRATION RATE: 18 BRPM | DIASTOLIC BLOOD PRESSURE: 81 MMHG
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
## 2025-07-31 DIAGNOSIS — S10.96XA TICK BITE OF NECK, INITIAL ENCOUNTER: Primary | ICD-10-CM

## 2025-07-31 PROCEDURE — 99202 OFFICE O/P NEW SF 15 MIN: CPT

## 2025-07-31 ASSESSMENT — ENCOUNTER SYMPTOMS
WOUND: 1
DEPRESSION: 0
OCCASIONAL FEELINGS OF UNSTEADINESS: 0
LOSS OF SENSATION IN FEET: 0

## 2025-07-31 NOTE — PROGRESS NOTES
Subjective   Patient ID: Chas Godinez is a 60 y.o. male. They present today with a chief complaint of No chief complaint on file..    History of Present Illness  Patient is a 60-year-old male with history of hyperlipidemia, hypertension, GERD, epilepsy and arthritis who presents urgent care today with a complaint of  tick bite.  He states when he got to work this morning, his boss noticed a tick on the left side of his neck.  Patient states it was easily and completely removed with a pair of tweezers.  He also notes the tick was definitely not attached this morning when he left for work.  He denies any other symptoms or complaints.      History provided by:  Patient      Past Medical History  Allergies as of 07/31/2025    (No Known Allergies)       Prescriptions Prior to Admission[1]     Medical History[2]    Surgical History[3]     reports that he quit smoking about 38 years ago. His smoking use included cigarettes. He has never used smokeless tobacco. He reports that he does not drink alcohol and does not use drugs.    Review of Systems  Review of Systems   Skin:  Positive for wound.                                  Objective    There were no vitals filed for this visit.  No LMP for male patient.    Physical Exam  Vitals and nursing note reviewed.   Constitutional:       General: He is not in acute distress.     Appearance: Normal appearance. He is not ill-appearing, toxic-appearing or diaphoretic.   HENT:      Head: Normocephalic and atraumatic.        Mouth/Throat:      Mouth: Mucous membranes are moist.     Eyes:      Extraocular Movements: Extraocular movements intact.      Conjunctiva/sclera: Conjunctivae normal.      Pupils: Pupils are equal, round, and reactive to light.       Cardiovascular:      Rate and Rhythm: Normal rate and regular rhythm.      Pulses: Normal pulses.      Heart sounds: Normal heart sounds.   Pulmonary:      Effort: Pulmonary effort is normal. No respiratory distress.      Breath  sounds: Normal breath sounds. No stridor. No wheezing, rhonchi or rales.   Chest:      Chest wall: No tenderness.     Musculoskeletal:         General: Normal range of motion.      Cervical back: Normal range of motion and neck supple.     Skin:     General: Skin is warm and dry.      Capillary Refill: Capillary refill takes less than 2 seconds.      Findings: Erythema present.     Neurological:      General: No focal deficit present.      Mental Status: He is alert and oriented to person, place, and time.     Psychiatric:         Mood and Affect: Mood normal.         Behavior: Behavior normal.         Procedures    Point of Care Test & Imaging Results from this visit  No results found for this visit on 07/31/25.   Imaging  No results found.    Cardiology, Vascular, and Other Imaging  No other imaging results found for the past 2 days      Diagnostic study results (if any) were reviewed by JOSE Bailey.    Assessment/Plan   Allergies, medications, history, and pertinent labs/EKGs/Imaging reviewed by JOSE Bailey.     Medical Decision Making    Patient is well appearing, afebrile, non toxic, not hypoxic, and appropriate for outpatient treatment and management at time of evaluation. Patient presents with tick bite.     Differential includes but not limited to: Tick bite, Lyme, abscess, cellulitis, localized reaction, other    On exam, patient has 2 to 3 mm area of erythema to the left neck as depicted above.  No induration or fluctuance.  No active bleeding or drainage.  No obvious foreign bodies.  Skin normal temperature.  No clinical signs of erythema migrans.  Patient brought the tick in for evaluation.  It does not appear engorged, is still alive and crawling around inside the plastic bag.  Considered antibiotic treatment but history is not consistent with ISDA a guidelines given the tick was only there for a couple hours at most.  Through shared decision-making, it was decided to hold  antibiotic treatment at this time.  Patient was given the opportunity ask questions.    Discussed return precautions and importance of follow-up. Advised to follow-up with PCP.  We spoke at length regarding the red flags he/she should be aware of and monitor for.  I specifically advised to go to the ED for changing or worsening symptoms, new symptoms, complaint specific precautions, and precautions listed on the discharge paperwork.     The plan of care was mutually agreed upon with the patient. All questions and concerns were answered to the best of my ability with today's information.  Patient was discharged in stable condition.    Dictation software was used in the creation of this note which does not evaluate or correct for typographical, spelling, syntax or grammatical errors.    Orders and Diagnoses  There are no diagnoses linked to this encounter.    Medical Admin Record      Patient disposition: Home    Electronically signed by JOSE Bailey  11:19 AM           [1] (Not in a hospital admission)  [2]   Past Medical History:  Diagnosis Date    Epilepsy     Esophageal reflux 05/18/2023   [3]   Past Surgical History:  Procedure Laterality Date    ELBOW SURGERY  09/19/2016    Elbow Surgery

## 2025-07-31 NOTE — LETTER
July 31, 2025     Patient: Chas Godinez   YOB: 1965   Date of Visit: 7/31/2025       To Whom It May Concern:    Chas Godinez was seen in my clinic on 7/31/2025 at 11:15 am. Please excuse Chas for his absence from work on this day to make the appointment.    If you have any questions or concerns, please don't hesitate to call.         Sincerely,         ABHILASH Bailey-CNP        CC: No Recipients

## 2025-07-31 NOTE — PATIENT INSTRUCTIONS
You were seen at Urgent Care today for a tick bite.  Please treat as discussed. Monitor for red flags which we spoke about, If your symptoms change, worsen or become concerning in any way, please go to the emergency room immediately, otherwise you can followup with your PCP in 2-3 days as needed

## 2025-08-04 ENCOUNTER — APPOINTMENT (OUTPATIENT)
Dept: PRIMARY CARE | Facility: CLINIC | Age: 60
End: 2025-08-04
Payer: COMMERCIAL

## 2025-08-04 VITALS
WEIGHT: 196 LBS | DIASTOLIC BLOOD PRESSURE: 78 MMHG | HEART RATE: 68 BPM | OXYGEN SATURATION: 95 % | SYSTOLIC BLOOD PRESSURE: 118 MMHG | HEIGHT: 65 IN | RESPIRATION RATE: 16 BRPM | BODY MASS INDEX: 32.65 KG/M2

## 2025-08-04 DIAGNOSIS — E11.9 NEW ONSET TYPE 2 DIABETES MELLITUS (MULTI): ICD-10-CM

## 2025-08-04 DIAGNOSIS — M19.09 PRIMARY OSTEOARTHRITIS OF OTHER SITE: ICD-10-CM

## 2025-08-04 DIAGNOSIS — G40.309 NONINTRACTABLE GENERALIZED IDIOPATHIC EPILEPSY WITHOUT STATUS EPILEPTICUS (MULTI): Primary | ICD-10-CM

## 2025-08-04 DIAGNOSIS — M19.90 ARTHRITIS: ICD-10-CM

## 2025-08-04 DIAGNOSIS — E78.2 MIXED HYPERLIPIDEMIA: ICD-10-CM

## 2025-08-04 DIAGNOSIS — R35.1 NOCTURIA: ICD-10-CM

## 2025-08-04 DIAGNOSIS — Z00.00 ROUTINE GENERAL MEDICAL EXAMINATION AT A HEALTH CARE FACILITY: ICD-10-CM

## 2025-08-04 DIAGNOSIS — K21.9 GASTROESOPHAGEAL REFLUX DISEASE WITHOUT ESOPHAGITIS: ICD-10-CM

## 2025-08-04 PROCEDURE — 3078F DIAST BP <80 MM HG: CPT | Performed by: FAMILY MEDICINE

## 2025-08-04 PROCEDURE — 3008F BODY MASS INDEX DOCD: CPT | Performed by: FAMILY MEDICINE

## 2025-08-04 PROCEDURE — 3074F SYST BP LT 130 MM HG: CPT | Performed by: FAMILY MEDICINE

## 2025-08-04 PROCEDURE — 99396 PREV VISIT EST AGE 40-64: CPT | Performed by: FAMILY MEDICINE

## 2025-08-04 RX ORDER — DICLOFENAC SODIUM 30 MG/G
1 GEL TOPICAL 2 TIMES DAILY
Qty: 100 G | Refills: 3 | Status: SHIPPED | OUTPATIENT
Start: 2025-08-04

## 2025-08-04 ASSESSMENT — ANXIETY QUESTIONNAIRES
IF YOU CHECKED OFF ANY PROBLEMS ON THIS QUESTIONNAIRE, HOW DIFFICULT HAVE THESE PROBLEMS MADE IT FOR YOU TO DO YOUR WORK, TAKE CARE OF THINGS AT HOME, OR GET ALONG WITH OTHER PEOPLE: NOT DIFFICULT AT ALL
3. WORRYING TOO MUCH ABOUT DIFFERENT THINGS: NOT AT ALL
2. NOT BEING ABLE TO STOP OR CONTROL WORRYING: NOT AT ALL
GAD7 TOTAL SCORE: 0
5. BEING SO RESTLESS THAT IT IS HARD TO SIT STILL: NOT AT ALL
4. TROUBLE RELAXING: NOT AT ALL
6. BECOMING EASILY ANNOYED OR IRRITABLE: NOT AT ALL
1. FEELING NERVOUS, ANXIOUS, OR ON EDGE: NOT AT ALL
7. FEELING AFRAID AS IF SOMETHING AWFUL MIGHT HAPPEN: NOT AT ALL

## 2025-08-04 ASSESSMENT — ENCOUNTER SYMPTOMS
LOSS OF SENSATION IN FEET: 0
DIARRHEA: 1
DEPRESSION: 0
OCCASIONAL FEELINGS OF UNSTEADINESS: 0

## 2025-08-04 NOTE — PROGRESS NOTES
Subjective   Chas Godinez is a 60 y.o. male and is here for a comprehensive physical exam. The patient reports no problems. 6o year old male for annual exam. Works at [employer] in [place]. Last seizure 1/2024. Sugars last time, A1C was 5.7 after being diagnosed with diabetes at A1C of 11.6%. Sometimes   Last physical: 2024  Changes no  Concerns no  Dentist no  Vision yes  Hearing Problems yes, decreased hearing bilateral.   Diet yes  Exercise yes Walks in morning  Alcohol no   Drugs no  Tobacco Use History[1]   never    Do you take any herbs or supplements that were not prescribed by a doctor? no  Are you taking calcium supplements? no  Are you taking aspirin daily? no      History:  Patient receives prostate care outside our clinic  Date last prostate exam: 2024    Do you have pain that bothers you in your daily life? no  Review of Systems   Gastrointestinal:  Positive for diarrhea.        Objective   Physical Exam  Vitals reviewed.   Constitutional:       Appearance: Normal appearance.   HENT:      Head: Normocephalic and atraumatic.      Right Ear: Tympanic membrane normal.      Left Ear: Tympanic membrane normal.      Nose: Nose normal.      Mouth/Throat:      Mouth: Mucous membranes are moist.      Pharynx: Oropharynx is clear.      Comments: Poor dentition    Eyes:      Extraocular Movements: Extraocular movements intact.      Conjunctiva/sclera: Conjunctivae normal.      Pupils: Pupils are equal, round, and reactive to light.       Cardiovascular:      Rate and Rhythm: Normal rate and regular rhythm.      Pulses: Normal pulses.      Heart sounds: Normal heart sounds.   Pulmonary:      Effort: Pulmonary effort is normal.      Breath sounds: Normal breath sounds.   Abdominal:      General: Abdomen is flat. Bowel sounds are normal.      Palpations: Abdomen is soft.     Musculoskeletal:         General: Normal range of motion.      Cervical back: Normal range of motion and neck supple.     Skin:     General: Skin  is warm and dry.      Capillary Refill: Capillary refill takes less than 2 seconds.     Neurological:      General: No focal deficit present.      Mental Status: He is alert and oriented to person, place, and time.     Psychiatric:         Mood and Affect: Mood normal.         Behavior: Behavior normal.         Assessment/Plan   Healthy male exam. 60 year old male here fo annual exam with sister. Labwork ordered. Discussed shingles vaccine, neurology follow up this year.  New onset diabetes was controlled last visit. Added diclofenac gel.     1. Follow up six months.   2. Patient Counseling:  --Nutrition: Stressed importance of moderation in sodium/caffeine intake, saturated fat and cholesterol, caloric balance, sufficient intake of fresh fruits, vegetables, fiber, calcium, iron, and 1 mg of folate supplement per day (for females capable of pregnancy).  --Discussed the issue of estrogen replacement, calcium supplement, and the daily use of baby aspirin.  --Exercise: Stressed the importance of regular exercise.   --Substance Abuse: Discussed cessation/primary prevention of tobacco, alcohol, or other drug use; driving or other dangerous activities under the influence; availability of treatment for abuse.    --Sexuality: Discussed sexually transmitted diseases, partner selection, use of condoms, avoidance of unintended pregnancy  and contraceptive alternatives.   --Injury prevention: Discussed safety belts, safety helmets, smoke detector, smoking near bedding or upholstery.   --Dental health: Discussed importance of regular tooth brushing, flossing, and dental visits.  --Immunizations reviewed.  --Discussed benefits of screening colonoscopy.  --After hours service discussed with patient  3. Discussed the patient's BMI with him.  The BMI is above average. The patient received discussion because they have an above normal BMI.  4. Follow up 6 months, one year.          [1]   Social History  Tobacco Use   Smoking Status  Former    Current packs/day: 0.00    Types: Cigarettes    Quit date:     Years since quittin.6    Passive exposure: Past   Smokeless Tobacco Never

## 2025-08-04 NOTE — PATIENT INSTRUCTIONS
It was good to see you.   Recheck six months  Labwork at your convenience  Wellness form to me when you would like.   Call if any questions  Can get your shingles vaccine at the pharmacy , just check with insurance.   Track your diarrhea- see how often and what foods. We can change to metformin ER.   I sent diclofenac to pharmacy for arms.

## 2025-08-17 LAB
ALBUMIN SERPL-MCNC: 4.8 G/DL (ref 3.6–5.1)
ALBUMIN/CREAT UR: NORMAL
ALP SERPL-CCNC: 73 U/L (ref 35–144)
ALT SERPL-CCNC: 20 U/L (ref 9–46)
ANION GAP SERPL CALCULATED.4IONS-SCNC: 8 MMOL/L (CALC) (ref 7–17)
AST SERPL-CCNC: 18 U/L (ref 10–35)
BASOPHILS # BLD AUTO: 29 CELLS/UL (ref 0–200)
BASOPHILS NFR BLD AUTO: 0.6 %
BILIRUB SERPL-MCNC: 0.4 MG/DL (ref 0.2–1.2)
BUN SERPL-MCNC: 14 MG/DL (ref 7–25)
CALCIUM SERPL-MCNC: 9.2 MG/DL (ref 8.6–10.3)
CHLORIDE SERPL-SCNC: 106 MMOL/L (ref 98–110)
CHOLEST SERPL-MCNC: 208 MG/DL
CHOLEST/HDLC SERPL: 4.1 (CALC)
CO2 SERPL-SCNC: 28 MMOL/L (ref 20–32)
CREAT SERPL-MCNC: 0.84 MG/DL (ref 0.7–1.35)
CREAT UR-MCNC: NORMAL MG/DL
EGFRCR SERPLBLD CKD-EPI 2021: 100 ML/MIN/1.73M2
EOSINOPHIL # BLD AUTO: 499 CELLS/UL (ref 15–500)
EOSINOPHIL NFR BLD AUTO: 10.4 %
ERYTHROCYTE [DISTWIDTH] IN BLOOD BY AUTOMATED COUNT: 12.8 % (ref 11–15)
EST. AVERAGE GLUCOSE BLD GHB EST-MCNC: 103 MG/DL
EST. AVERAGE GLUCOSE BLD GHB EST-SCNC: 5.7 MMOL/L
GLUCOSE SERPL-MCNC: 94 MG/DL (ref 65–99)
HBA1C MFR BLD: 5.2 %
HCT VFR BLD AUTO: 44.5 % (ref 38.5–50)
HDLC SERPL-MCNC: 51 MG/DL
HGB BLD-MCNC: 14.8 G/DL (ref 13.2–17.1)
LDLC SERPL CALC-MCNC: 135 MG/DL (CALC)
LEVETIRACETAM SERPL-MCNC: NORMAL UG/ML
LYMPHOCYTES # BLD AUTO: 1301 CELLS/UL (ref 850–3900)
LYMPHOCYTES NFR BLD AUTO: 27.1 %
MCH RBC QN AUTO: 30.7 PG (ref 27–33)
MCHC RBC AUTO-ENTMCNC: 33.3 G/DL (ref 32–36)
MCV RBC AUTO: 92.3 FL (ref 80–100)
MICROALBUMIN UR-MCNC: NORMAL
MONOCYTES # BLD AUTO: 422 CELLS/UL (ref 200–950)
MONOCYTES NFR BLD AUTO: 8.8 %
NEUTROPHILS # BLD AUTO: 2549 CELLS/UL (ref 1500–7800)
NEUTROPHILS NFR BLD AUTO: 53.1 %
NONHDLC SERPL-MCNC: 157 MG/DL (CALC)
PHENYTOIN SERPL-MCNC: 28.1 MG/L (ref 10–20)
PLATELET # BLD AUTO: 199 THOUSAND/UL (ref 140–400)
PMV BLD REES-ECKER: 9.7 FL (ref 7.5–12.5)
POTASSIUM SERPL-SCNC: 4 MMOL/L (ref 3.5–5.3)
PROT SERPL-MCNC: 7.3 G/DL (ref 6.1–8.1)
PSA SERPL-MCNC: 0.72 NG/ML
RBC # BLD AUTO: 4.82 MILLION/UL (ref 4.2–5.8)
SODIUM SERPL-SCNC: 142 MMOL/L (ref 135–146)
TRIGL SERPL-MCNC: 109 MG/DL
TSH SERPL-ACNC: 1.31 MIU/L (ref 0.4–4.5)
WBC # BLD AUTO: 4.8 THOUSAND/UL (ref 3.8–10.8)

## 2025-08-20 LAB
ALBUMIN SERPL-MCNC: 4.8 G/DL (ref 3.6–5.1)
ALBUMIN/CREAT UR: 9 MG/G CREAT
ALP SERPL-CCNC: 73 U/L (ref 35–144)
ALT SERPL-CCNC: 20 U/L (ref 9–46)
ANION GAP SERPL CALCULATED.4IONS-SCNC: 8 MMOL/L (CALC) (ref 7–17)
AST SERPL-CCNC: 18 U/L (ref 10–35)
BASOPHILS # BLD AUTO: 29 CELLS/UL (ref 0–200)
BASOPHILS NFR BLD AUTO: 0.6 %
BILIRUB SERPL-MCNC: 0.4 MG/DL (ref 0.2–1.2)
BUN SERPL-MCNC: 14 MG/DL (ref 7–25)
CALCIUM SERPL-MCNC: 9.2 MG/DL (ref 8.6–10.3)
CHLORIDE SERPL-SCNC: 106 MMOL/L (ref 98–110)
CHOLEST SERPL-MCNC: 208 MG/DL
CHOLEST/HDLC SERPL: 4.1 (CALC)
CO2 SERPL-SCNC: 28 MMOL/L (ref 20–32)
CREAT SERPL-MCNC: 0.84 MG/DL (ref 0.7–1.35)
CREAT UR-MCNC: 131 MG/DL (ref 20–320)
EGFRCR SERPLBLD CKD-EPI 2021: 100 ML/MIN/1.73M2
EOSINOPHIL # BLD AUTO: 499 CELLS/UL (ref 15–500)
EOSINOPHIL NFR BLD AUTO: 10.4 %
ERYTHROCYTE [DISTWIDTH] IN BLOOD BY AUTOMATED COUNT: 12.8 % (ref 11–15)
EST. AVERAGE GLUCOSE BLD GHB EST-MCNC: 103 MG/DL
EST. AVERAGE GLUCOSE BLD GHB EST-SCNC: 5.7 MMOL/L
GLUCOSE SERPL-MCNC: 94 MG/DL (ref 65–99)
HBA1C MFR BLD: 5.2 %
HCT VFR BLD AUTO: 44.5 % (ref 38.5–50)
HDLC SERPL-MCNC: 51 MG/DL
HGB BLD-MCNC: 14.8 G/DL (ref 13.2–17.1)
LDLC SERPL CALC-MCNC: 135 MG/DL (CALC)
LEVETIRACETAM SERPL-MCNC: 18.2 MCG/ML
LYMPHOCYTES # BLD AUTO: 1301 CELLS/UL (ref 850–3900)
LYMPHOCYTES NFR BLD AUTO: 27.1 %
MCH RBC QN AUTO: 30.7 PG (ref 27–33)
MCHC RBC AUTO-ENTMCNC: 33.3 G/DL (ref 32–36)
MCV RBC AUTO: 92.3 FL (ref 80–100)
MICROALBUMIN UR-MCNC: 1.2 MG/DL
MONOCYTES # BLD AUTO: 422 CELLS/UL (ref 200–950)
MONOCYTES NFR BLD AUTO: 8.8 %
NEUTROPHILS # BLD AUTO: 2549 CELLS/UL (ref 1500–7800)
NEUTROPHILS NFR BLD AUTO: 53.1 %
NONHDLC SERPL-MCNC: 157 MG/DL (CALC)
PHENYTOIN SERPL-MCNC: 28.1 MG/L (ref 10–20)
PLATELET # BLD AUTO: 199 THOUSAND/UL (ref 140–400)
PMV BLD REES-ECKER: 9.7 FL (ref 7.5–12.5)
POTASSIUM SERPL-SCNC: 4 MMOL/L (ref 3.5–5.3)
PROT SERPL-MCNC: 7.3 G/DL (ref 6.1–8.1)
PSA SERPL-MCNC: 0.72 NG/ML
RBC # BLD AUTO: 4.82 MILLION/UL (ref 4.2–5.8)
SODIUM SERPL-SCNC: 142 MMOL/L (ref 135–146)
TRIGL SERPL-MCNC: 109 MG/DL
TSH SERPL-ACNC: 1.31 MIU/L (ref 0.4–4.5)
WBC # BLD AUTO: 4.8 THOUSAND/UL (ref 3.8–10.8)

## 2025-09-05 ENCOUNTER — TELEPHONE (OUTPATIENT)
Dept: PRIMARY CARE | Facility: CLINIC | Age: 60
End: 2025-09-05
Payer: COMMERCIAL

## 2026-02-05 ENCOUNTER — APPOINTMENT (OUTPATIENT)
Dept: PRIMARY CARE | Facility: CLINIC | Age: 61
End: 2026-02-05
Payer: COMMERCIAL

## 2026-08-11 ENCOUNTER — APPOINTMENT (OUTPATIENT)
Dept: PRIMARY CARE | Facility: CLINIC | Age: 61
End: 2026-08-11
Payer: COMMERCIAL